# Patient Record
Sex: MALE | Race: WHITE | NOT HISPANIC OR LATINO | Employment: UNEMPLOYED | URBAN - METROPOLITAN AREA
[De-identification: names, ages, dates, MRNs, and addresses within clinical notes are randomized per-mention and may not be internally consistent; named-entity substitution may affect disease eponyms.]

---

## 2019-01-10 ENCOUNTER — NON-PROVIDER VISIT (OUTPATIENT)
Dept: URGENT CARE | Facility: PHYSICIAN GROUP | Age: 29
End: 2019-01-10

## 2019-01-10 DIAGNOSIS — Z02.1 PRE-EMPLOYMENT DRUG SCREENING: ICD-10-CM

## 2019-01-10 LAB
AMP AMPHETAMINE: NORMAL
COC COCAINE: NORMAL
INT CON NEG: NEGATIVE
INT CON POS: POSITIVE
MET METHAMPHETAMINES: NORMAL
OPI OPIATES: NORMAL
PCP PHENCYCLIDINE: NORMAL
POC DRUG COMMENT 753798-OCCUPATIONAL HEALTH: NEGATIVE
THC: NORMAL

## 2019-01-10 PROCEDURE — 80305 DRUG TEST PRSMV DIR OPT OBS: CPT | Performed by: FAMILY MEDICINE

## 2019-11-26 ENCOUNTER — HOSPITAL ENCOUNTER (EMERGENCY)
Facility: MEDICAL CENTER | Age: 29
End: 2019-11-26
Attending: EMERGENCY MEDICINE
Payer: COMMERCIAL

## 2019-11-26 VITALS
RESPIRATION RATE: 18 BRPM | SYSTOLIC BLOOD PRESSURE: 110 MMHG | WEIGHT: 171.96 LBS | HEIGHT: 70 IN | OXYGEN SATURATION: 98 % | DIASTOLIC BLOOD PRESSURE: 65 MMHG | BODY MASS INDEX: 24.62 KG/M2 | TEMPERATURE: 97.9 F | HEART RATE: 76 BPM

## 2019-11-26 DIAGNOSIS — R11.2 NON-INTRACTABLE VOMITING WITH NAUSEA, UNSPECIFIED VOMITING TYPE: ICD-10-CM

## 2019-11-26 DIAGNOSIS — R19.7 DIARRHEA, UNSPECIFIED TYPE: ICD-10-CM

## 2019-11-26 LAB
ALBUMIN SERPL BCP-MCNC: 4.2 G/DL (ref 3.2–4.9)
ALBUMIN/GLOB SERPL: 1.8 G/DL
ALP SERPL-CCNC: 73 U/L (ref 30–99)
ALT SERPL-CCNC: 19 U/L (ref 2–50)
ANION GAP SERPL CALC-SCNC: 7 MMOL/L (ref 0–11.9)
AST SERPL-CCNC: 17 U/L (ref 12–45)
BASOPHILS # BLD AUTO: 0.4 % (ref 0–1.8)
BASOPHILS # BLD: 0.03 K/UL (ref 0–0.12)
BILIRUB SERPL-MCNC: 0.5 MG/DL (ref 0.1–1.5)
BUN SERPL-MCNC: 21 MG/DL (ref 8–22)
CALCIUM SERPL-MCNC: 8.4 MG/DL (ref 8.5–10.5)
CHLORIDE SERPL-SCNC: 102 MMOL/L (ref 96–112)
CO2 SERPL-SCNC: 30 MMOL/L (ref 20–33)
CREAT SERPL-MCNC: 1 MG/DL (ref 0.5–1.4)
EOSINOPHIL # BLD AUTO: 0.04 K/UL (ref 0–0.51)
EOSINOPHIL NFR BLD: 0.5 % (ref 0–6.9)
ERYTHROCYTE [DISTWIDTH] IN BLOOD BY AUTOMATED COUNT: 42 FL (ref 35.9–50)
GLOBULIN SER CALC-MCNC: 2.3 G/DL (ref 1.9–3.5)
GLUCOSE SERPL-MCNC: 94 MG/DL (ref 65–99)
HCT VFR BLD AUTO: 48.7 % (ref 42–52)
HGB BLD-MCNC: 16.4 G/DL (ref 14–18)
IMM GRANULOCYTES # BLD AUTO: 0.03 K/UL (ref 0–0.11)
IMM GRANULOCYTES NFR BLD AUTO: 0.4 % (ref 0–0.9)
LYMPHOCYTES # BLD AUTO: 1.35 K/UL (ref 1–4.8)
LYMPHOCYTES NFR BLD: 16.1 % (ref 22–41)
MCH RBC QN AUTO: 29.4 PG (ref 27–33)
MCHC RBC AUTO-ENTMCNC: 33.7 G/DL (ref 33.7–35.3)
MCV RBC AUTO: 87.3 FL (ref 81.4–97.8)
MONOCYTES # BLD AUTO: 0.72 K/UL (ref 0–0.85)
MONOCYTES NFR BLD AUTO: 8.6 % (ref 0–13.4)
NEUTROPHILS # BLD AUTO: 6.2 K/UL (ref 1.82–7.42)
NEUTROPHILS NFR BLD: 74 % (ref 44–72)
NRBC # BLD AUTO: 0 K/UL
NRBC BLD-RTO: 0 /100 WBC
PLATELET # BLD AUTO: 268 K/UL (ref 164–446)
PMV BLD AUTO: 10.2 FL (ref 9–12.9)
POTASSIUM SERPL-SCNC: 3.4 MMOL/L (ref 3.6–5.5)
PROT SERPL-MCNC: 6.5 G/DL (ref 6–8.2)
RBC # BLD AUTO: 5.58 M/UL (ref 4.7–6.1)
SODIUM SERPL-SCNC: 139 MMOL/L (ref 135–145)
WBC # BLD AUTO: 8.4 K/UL (ref 4.8–10.8)

## 2019-11-26 PROCEDURE — 36415 COLL VENOUS BLD VENIPUNCTURE: CPT

## 2019-11-26 PROCEDURE — 85025 COMPLETE CBC W/AUTO DIFF WBC: CPT

## 2019-11-26 PROCEDURE — 99284 EMERGENCY DEPT VISIT MOD MDM: CPT

## 2019-11-26 PROCEDURE — 96374 THER/PROPH/DIAG INJ IV PUSH: CPT

## 2019-11-26 PROCEDURE — 700111 HCHG RX REV CODE 636 W/ 250 OVERRIDE (IP): Performed by: EMERGENCY MEDICINE

## 2019-11-26 PROCEDURE — 80053 COMPREHEN METABOLIC PANEL: CPT

## 2019-11-26 PROCEDURE — 700105 HCHG RX REV CODE 258: Performed by: EMERGENCY MEDICINE

## 2019-11-26 RX ORDER — ONDANSETRON 2 MG/ML
4 INJECTION INTRAMUSCULAR; INTRAVENOUS ONCE
Status: COMPLETED | OUTPATIENT
Start: 2019-11-26 | End: 2019-11-26

## 2019-11-26 RX ORDER — SODIUM CHLORIDE 9 MG/ML
1000 INJECTION, SOLUTION INTRAVENOUS ONCE
Status: COMPLETED | OUTPATIENT
Start: 2019-11-26 | End: 2019-11-26

## 2019-11-26 RX ORDER — ONDANSETRON 4 MG/1
4 TABLET, ORALLY DISINTEGRATING ORAL EVERY 6 HOURS PRN
Qty: 16 TAB | Refills: 0 | Status: SHIPPED | OUTPATIENT
Start: 2019-11-26 | End: 2019-11-30

## 2019-11-26 RX ADMIN — SODIUM CHLORIDE 1000 ML: 9 INJECTION, SOLUTION INTRAVENOUS at 01:53

## 2019-11-26 RX ADMIN — ONDANSETRON 4 MG: 2 INJECTION INTRAMUSCULAR; INTRAVENOUS at 01:53

## 2019-11-26 ASSESSMENT — ENCOUNTER SYMPTOMS
SORE THROAT: 0
VOMITING: 1
BLOOD IN STOOL: 0
FEVER: 0
BACK PAIN: 0
CHILLS: 0
DIARRHEA: 1
SHORTNESS OF BREATH: 0
HEADACHES: 0
NAUSEA: 1
DIZZINESS: 0

## 2019-11-26 ASSESSMENT — LIFESTYLE VARIABLES: SUBSTANCE_ABUSE: 0

## 2019-11-26 NOTE — ED TRIAGE NOTES
"Jonathan Edward Kimura     Chief Complaint   Patient presents with   • N/V   • Diarrhea       Patient ambulated to triage for above complaint. Patient states his family recently had the stomach flu, and patient started to have symptoms last night. Denies abd pain, lightheadedness, CP.    Patient educated on triage process and to notify RN of any new or worsening symptoms.     Blood Pressure: 100/60, Pulse: 77, Respiration: 16, Temperature: 36.6 °C (97.9 °F), Height: 177.8 cm (5' 10\"), Weight: 78 kg (171 lb 15.3 oz), Pulse Oximetry: 98 %, O2 Delivery: None (Room Air)   "

## 2019-11-26 NOTE — ED NOTES
Pt ambulatory to room 23. Pt c/o n/v/d x 24 hours. Reports vomiting x5, diarrhea x 7-8, c/o intermittent left sided abd pain.

## 2019-11-26 NOTE — ED NOTES
PIV established, blood drawn, sent to lab. Pt medicated per ERP orders.  Updated pt on POC. Call light in reach.

## 2019-11-26 NOTE — ED NOTES
Pt given D/C instructions, prescription and follow up instructions pt has no further issues, N/V has resolved at present time.

## 2019-11-26 NOTE — ED PROVIDER NOTES
ED Provider Note     11/26/2019  1:32 AM    Means of Arrival: Walk In  History obtained by: patient  Limitations: none    CHIEF COMPLAINT  Chief Complaint   Patient presents with   • N/V   • Diarrhea       HPI  Jonathan Edward Kimura is a 29 y.o. male who presents with concerns of nausea, vomiting, and diarrhea for approximately 48 hours.  2 days ago he was driving to [employer] from [place] when symptoms started.  He has 2 family members with similar symptoms however there symptoms only lasted 24 hours.  He says he has been unable to tolerate any oral intake for over 24 hours.  Diarrhea is watery without blood.  Generalized abdominal discomfort without any localized pain.  No fevers.  He came to the emergency department because he was unable to keep any fluids down.    REVIEW OF SYSTEMS  Review of Systems   Constitutional: Negative for chills and fever.   HENT: Negative for sore throat.    Respiratory: Negative for shortness of breath.    Cardiovascular: Negative for chest pain.   Gastrointestinal: Positive for diarrhea, nausea and vomiting. Negative for blood in stool.        He has noticed some small streaks of blood in vomit but that was after multiple episodes of vomiting and retching.   Genitourinary: Negative for dysuria.   Musculoskeletal: Negative for back pain.   Neurological: Negative for dizziness and headaches.   Psychiatric/Behavioral: Negative for substance abuse.   All other systems reviewed and are negative.    See HPI for further details.    PAST MEDICAL HISTORY   Otherwise healthy    SOCIAL HISTORY  Social History     Tobacco Use   • Smoking status: Current Every Day Smoker     Packs/day: 0.00     Types: Cigarettes   • Smokeless tobacco: Never Used   • Tobacco comment: 2-3 day   Substance and Sexual Activity   • Alcohol use: Yes     Comment: occ   • Drug use: Never   • Sexual activity: Not on file       SURGICAL HISTORY  patient denies any surgical history    CURRENT MEDICATIONS  Home Medications      "Reviewed by Raisa Trevino R.N. (Registered Nurse) on 11/26/19 at 0111  Med List Status: Complete   Medication Last Dose Status        Patient Dusty Taking any Medications                       ALLERGIES  No Known Allergies    PHYSICAL EXAM  VITAL SIGNS: /67   Pulse 61   Temp 36.6 °C (97.9 °F) (Oral)   Resp 15   Ht 1.778 m (5' 10\")   Wt 78 kg (171 lb 15.3 oz)   SpO2 98%   BMI 24.67 kg/m²     Pulse ox interpretation: I interpret this pulse ox as normal.  Constitutional: Alert in no apparent distress.  Well-nourished 29-year-old male.  HENT: No signs of trauma, Bilateral external ears normal, Nose normal.  Dry mucous membranes.  Eyes: Pupils are equal, Conjunctiva normal, Non-icteric.   Neck: Normal range of motion, No tenderness, Supple, No stridor.   Cardiovascular: Regular rate and rhythm, no murmurs. Symmetric distal pulses. No cyanosis of extremities. No peripheral edema of extremities.  Thorax & Lungs: Normal breath sounds, No respiratory distress, No wheezing, No chest tenderness.   Abdomen: Bowel sounds normal, Soft, No tenderness, No masses, No peritoneal signs.  Skin: Warm, Dry, No erythema, No rash.   Back: No midline bony tenderness, No CVA tenderness.   Musculoskeletal: Good range of motion in all major joints. No tenderness to palpation or major deformities noted.   Neurologic: Alert , Normal motor function, Normal sensory function, No focal deficits noted.   Psychiatric: Affect normal, Judgment normal, Mood normal.   Physical Exam      DIAGNOSTIC STUDIES / PROCEDURES    LABS  Pertinent Labs & Imaging studies reviewed. (See chart for details)    RADIOLOGY  Pertinent Labs & Imaging studies reviewed. (See chart for details)    COURSE & MEDICAL DECISION MAKING  Pertinent Labs & Imaging studies reviewed. (See chart for details)    1:32 AM This is an emergent evaluation of a  29 y.o. male who presents with nausea, vomiting, diarrhea for nearly 48 hours.  Unable to tolerate any oral intake.. " Physical exam significant for signs of dehydration with dry mucous membranes.  Vitals are normal.  Abdomen is soft and nontender.  The differential diagnosis includes but is not limited to most likely viral gastroenteritis, lower suspicion for foodborne illness.  Low suspicion for acute abdomen such as appendicitis, pancreatitis, colitis.  Ordered for blood count and metabolic panel to evaluate. Patient will be treated with Zofran, IV fluids for his symptoms.  IV fluids to be given for signs of dehydration, unable to tolerate oral intake.  After receiving fluids and Zofran we will do a p.o. challenge.    3:45 AM  Labs within acceptable limits.  Potassium is slightly below normal but this should easily be replenished with oral intake, I balance solution.  Encouraged him to continue oral fluids at home for rehydration.  His vitals continue to be stable after fluids.  He has no abdominal pain at this time.  He has tolerated oral intake.  I continue to suspect that his presentation is most likely due to viral gastroenteritis.  He was given instructions to return if unable to stop vomiting, localized abdominal pain, grossly bloody diarrhea or any other serious concerns.  He is also instructed to contact primary provider for reevaluation if he continues to have diarrhea without any other severe symptoms.     The patient will return for worsening symptoms and is stable at the time of discharge. The patient verbalizes understanding. Guidance was provided on appropriate use of medications including driving under the influence, overdose, and side effects.         FINAL IMPRESSION    ICD-10-CM   1. Diarrhea, unspecified type R19.7   2. Non-intractable vomiting with nausea, unspecified vomiting type R11.2            This dictation was created using voice recognition software. The accuracy of the dictation is limited to the abilities of the software. I expect there may be some errors of grammar and possibly content. The nursing  notes were reviewed and certain aspects of this information were incorporated into this note.    Electronically signed by: Pedro Jenkins II, 11/26/2019 1:32 AM

## 2020-02-17 ENCOUNTER — HOSPITAL ENCOUNTER (OUTPATIENT)
Dept: RADIOLOGY | Facility: MEDICAL CENTER | Age: 30
End: 2020-02-17
Attending: FAMILY MEDICINE
Payer: COMMERCIAL

## 2020-02-17 ENCOUNTER — OFFICE VISIT (OUTPATIENT)
Dept: URGENT CARE | Facility: PHYSICIAN GROUP | Age: 30
End: 2020-02-17
Payer: COMMERCIAL

## 2020-02-17 VITALS
HEIGHT: 70 IN | RESPIRATION RATE: 20 BRPM | SYSTOLIC BLOOD PRESSURE: 86 MMHG | HEART RATE: 70 BPM | TEMPERATURE: 99.1 F | BODY MASS INDEX: 24.62 KG/M2 | WEIGHT: 172 LBS | OXYGEN SATURATION: 97 % | DIASTOLIC BLOOD PRESSURE: 62 MMHG

## 2020-02-17 DIAGNOSIS — S80.12XA CONTUSION OF LEFT LOWER EXTREMITY, INITIAL ENCOUNTER: ICD-10-CM

## 2020-02-17 DIAGNOSIS — S89.92XA SHIN INJURY, LEFT, INITIAL ENCOUNTER: ICD-10-CM

## 2020-02-17 PROCEDURE — 73590 X-RAY EXAM OF LOWER LEG: CPT | Mod: LT

## 2020-02-17 PROCEDURE — 99203 OFFICE O/P NEW LOW 30 MIN: CPT | Performed by: FAMILY MEDICINE

## 2020-02-17 RX ORDER — DICLOFENAC SODIUM 75 MG/1
75 TABLET, DELAYED RELEASE ORAL 2 TIMES DAILY
Qty: 14 TAB | Refills: 0 | Status: SHIPPED | OUTPATIENT
Start: 2020-02-17 | End: 2020-03-31

## 2020-02-17 ASSESSMENT — PAIN SCALES - GENERAL: PAINLEVEL: 7=MODERATE-SEVERE PAIN

## 2020-02-17 NOTE — LETTER
February 17, 2020      To Whom It May Concern:           This is confirmation that Jonathan Edward Kimura attended his scheduled appointment with Geovanny Aldana M.D. on 2/17/20.  Please excuse him from work.  He needs to be on crutches and off of his left foot for the next 3 days.             If you have any questions please do not hesitate to call me at the phone number listed below.      Sincerely,          Geovanny Aldana M.D.  [phone]

## 2020-02-18 ASSESSMENT — ENCOUNTER SYMPTOMS
FEVER: 0
FOCAL WEAKNESS: 0
FALLS: 1
SHORTNESS OF BREATH: 0
TINGLING: 0
VOMITING: 0
SENSORY CHANGE: 0

## 2020-02-18 NOTE — PROGRESS NOTES
"Subjective:     Jonathan Edward Kimura is a 29 y.o. male who presents for Leg Pain (L leg injury, fell yesterday)    HPI  Pt presents for evaluation of a new problem   Patient fell through a horse trailer yesterday and landed on left shin  Sustained only a small abrasion and did not seek medical care at the time  Woke up this morning and pain worsening when trying to walk  Pain is mainly along the anterior shin  Pain is constantly present  Has some associated swelling in shin    Review of Systems   Constitutional: Negative for fever.   Respiratory: Negative for shortness of breath.    Cardiovascular: Negative for chest pain.   Gastrointestinal: Negative for vomiting.   Musculoskeletal: Positive for falls.   Skin: Negative for rash.   Neurological: Negative for tingling, sensory change and focal weakness.     PMH: No chronic medical problems   MEDS: No daily meds   ALLERGIES: No Known Allergies  SURGHX: None  SOCHX:  reports that he has been smoking cigarettes. He has been smoking about 0.00 packs per day. He has never used smokeless tobacco. He reports current alcohol use. He reports that he does not use drugs.  FH: Family history was reviewed, not contributing to acute injury      Objective:   BP (!) 86/62   Pulse 70   Temp 37.3 °C (99.1 °F) (Temporal)   Resp 20   Ht 1.778 m (5' 10\")   Wt 78 kg (172 lb)   SpO2 97%   BMI 24.68 kg/m²     Physical Exam  Constitutional:       General: He is not in acute distress.     Appearance: He is well-developed. He is not diaphoretic.   HENT:      Head: Normocephalic and atraumatic.   Musculoskeletal:      Comments: Left lower extremity   Appearance - Small abrasion along anterior shin  Palpation - +TTP over anterior shin along tibia and anterior compartment   ROM - FROM knee and ankle  Strength - 5/5 throughout  Neuro - Sensation intact   Vascular - Foot warm, good perfusion, dorsalis pedis pulse 2+   Skin:     General: Skin is warm and dry.   Neurological:      Mental " Status: He is alert and oriented to person, place, and time.   Psychiatric:         Behavior: Behavior normal.         Thought Content: Thought content normal.         Judgment: Judgment normal.       Assessment/Plan:   Assessment    1. Contusion of left lower extremity, initial encounter  - DX-TIBIA AND FIBULA LEFT; Future  - diclofenac EC (VOLTAREN) 75 MG Tablet Delayed Response; Take 1 Tab by mouth 2 times a day.  Dispense: 14 Tab; Refill: 0    Patient with left shin contusion after a fall yesterday.  Markedly tender over the bony prominence and an x-ray was obtained to rule out fracture.  X-ray within normal limits.  Patient has no signs of compartment syndrome, however did discuss these signs to watch for in case he should develop with them over the next few days.  Patient will take scheduled anti-inflammatory, use ice, and weightbearing as tolerated for the next few days.  Reviewed close follow-up precautions and will follow-up with his PCP.

## 2020-02-28 ENCOUNTER — OFFICE VISIT (OUTPATIENT)
Dept: MEDICAL GROUP | Facility: PHYSICIAN GROUP | Age: 30
End: 2020-02-28
Payer: COMMERCIAL

## 2020-02-28 VITALS
TEMPERATURE: 98.9 F | HEART RATE: 78 BPM | HEIGHT: 70 IN | DIASTOLIC BLOOD PRESSURE: 62 MMHG | BODY MASS INDEX: 24.77 KG/M2 | OXYGEN SATURATION: 98 % | SYSTOLIC BLOOD PRESSURE: 118 MMHG | WEIGHT: 173 LBS

## 2020-02-28 DIAGNOSIS — Z11.3 ROUTINE SCREENING FOR STI (SEXUALLY TRANSMITTED INFECTION): ICD-10-CM

## 2020-02-28 DIAGNOSIS — R29.898 KNEE CLICKING: ICD-10-CM

## 2020-02-28 DIAGNOSIS — S89.92XD INJURY OF LEFT KNEE, SUBSEQUENT ENCOUNTER: ICD-10-CM

## 2020-02-28 DIAGNOSIS — M25.362 INSTABILITY OF LEFT KNEE JOINT: ICD-10-CM

## 2020-02-28 DIAGNOSIS — Z13.21 ENCOUNTER FOR VITAMIN DEFICIENCY SCREENING: ICD-10-CM

## 2020-02-28 DIAGNOSIS — M25.562 ACUTE PAIN OF LEFT KNEE: ICD-10-CM

## 2020-02-28 DIAGNOSIS — Z13.29 SCREENING FOR THYROID DISORDER: ICD-10-CM

## 2020-02-28 DIAGNOSIS — Z20.2 EXPOSURE TO STD: ICD-10-CM

## 2020-02-28 DIAGNOSIS — R06.83 SNORING: ICD-10-CM

## 2020-02-28 DIAGNOSIS — Z23 NEED FOR VACCINATION: ICD-10-CM

## 2020-02-28 DIAGNOSIS — R40.0 DAYTIME SOMNOLENCE: ICD-10-CM

## 2020-02-28 DIAGNOSIS — Z13.6 SCREENING FOR CARDIOVASCULAR CONDITION: ICD-10-CM

## 2020-02-28 DIAGNOSIS — R53.82 CHRONIC FATIGUE: ICD-10-CM

## 2020-02-28 PROCEDURE — 99214 OFFICE O/P EST MOD 30 MIN: CPT | Mod: 25 | Performed by: NURSE PRACTITIONER

## 2020-02-28 PROCEDURE — 90715 TDAP VACCINE 7 YRS/> IM: CPT | Performed by: NURSE PRACTITIONER

## 2020-02-28 PROCEDURE — 90471 IMMUNIZATION ADMIN: CPT | Performed by: NURSE PRACTITIONER

## 2020-02-28 SDOH — HEALTH STABILITY: MENTAL HEALTH: HOW MANY STANDARD DRINKS CONTAINING ALCOHOL DO YOU HAVE ON A TYPICAL DAY?: 5 OR 6

## 2020-02-28 SDOH — HEALTH STABILITY: MENTAL HEALTH: HOW OFTEN DO YOU HAVE A DRINK CONTAINING ALCOHOL?: MONTHLY OR LESS

## 2020-02-28 SDOH — HEALTH STABILITY: MENTAL HEALTH: HOW OFTEN DO YOU HAVE 6 OR MORE DRINKS ON ONE OCCASION?: MONTHLY

## 2020-02-28 ASSESSMENT — PATIENT HEALTH QUESTIONNAIRE - PHQ9: CLINICAL INTERPRETATION OF PHQ2 SCORE: 0

## 2020-02-28 ASSESSMENT — FIBROSIS 4 INDEX: FIB4 SCORE: 0.42
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
## 2020-03-02 ENCOUNTER — HOSPITAL ENCOUNTER (OUTPATIENT)
Dept: LAB | Facility: MEDICAL CENTER | Age: 30
End: 2020-03-02
Attending: NURSE PRACTITIONER
Payer: COMMERCIAL

## 2020-03-02 DIAGNOSIS — Z13.6 SCREENING FOR CARDIOVASCULAR CONDITION: ICD-10-CM

## 2020-03-02 DIAGNOSIS — Z13.29 SCREENING FOR THYROID DISORDER: ICD-10-CM

## 2020-03-02 DIAGNOSIS — Z11.3 ROUTINE SCREENING FOR STI (SEXUALLY TRANSMITTED INFECTION): ICD-10-CM

## 2020-03-02 DIAGNOSIS — Z13.21 ENCOUNTER FOR VITAMIN DEFICIENCY SCREENING: ICD-10-CM

## 2020-03-02 DIAGNOSIS — R53.82 CHRONIC FATIGUE: ICD-10-CM

## 2020-03-02 LAB
BASOPHILS # BLD AUTO: 0.9 % (ref 0–1.8)
BASOPHILS # BLD: 0.06 K/UL (ref 0–0.12)
EOSINOPHIL # BLD AUTO: 0.1 K/UL (ref 0–0.51)
EOSINOPHIL NFR BLD: 1.4 % (ref 0–6.9)
ERYTHROCYTE [DISTWIDTH] IN BLOOD BY AUTOMATED COUNT: 43.7 FL (ref 35.9–50)
HCT VFR BLD AUTO: 49.8 % (ref 42–52)
HGB BLD-MCNC: 16.6 G/DL (ref 14–18)
IMM GRANULOCYTES # BLD AUTO: 0.02 K/UL (ref 0–0.11)
IMM GRANULOCYTES NFR BLD AUTO: 0.3 % (ref 0–0.9)
LYMPHOCYTES # BLD AUTO: 2.09 K/UL (ref 1–4.8)
LYMPHOCYTES NFR BLD: 30.3 % (ref 22–41)
MCH RBC QN AUTO: 29.4 PG (ref 27–33)
MCHC RBC AUTO-ENTMCNC: 33.3 G/DL (ref 33.7–35.3)
MCV RBC AUTO: 88.3 FL (ref 81.4–97.8)
MONOCYTES # BLD AUTO: 0.54 K/UL (ref 0–0.85)
MONOCYTES NFR BLD AUTO: 7.8 % (ref 0–13.4)
NEUTROPHILS # BLD AUTO: 4.09 K/UL (ref 1.82–7.42)
NEUTROPHILS NFR BLD: 59.3 % (ref 44–72)
NRBC # BLD AUTO: 0 K/UL
NRBC BLD-RTO: 0 /100 WBC
PLATELET # BLD AUTO: 312 K/UL (ref 164–446)
PMV BLD AUTO: 10.2 FL (ref 9–12.9)
RBC # BLD AUTO: 5.64 M/UL (ref 4.7–6.1)
WBC # BLD AUTO: 6.9 K/UL (ref 4.8–10.8)

## 2020-03-02 PROCEDURE — 80061 LIPID PANEL: CPT

## 2020-03-02 PROCEDURE — 84270 ASSAY OF SEX HORMONE GLOBUL: CPT

## 2020-03-02 PROCEDURE — 84443 ASSAY THYROID STIM HORMONE: CPT

## 2020-03-02 PROCEDURE — 87389 HIV-1 AG W/HIV-1&-2 AB AG IA: CPT

## 2020-03-02 PROCEDURE — 87591 N.GONORRHOEAE DNA AMP PROB: CPT

## 2020-03-02 PROCEDURE — 80053 COMPREHEN METABOLIC PANEL: CPT

## 2020-03-02 PROCEDURE — 84402 ASSAY OF FREE TESTOSTERONE: CPT

## 2020-03-02 PROCEDURE — 87491 CHLMYD TRACH DNA AMP PROBE: CPT

## 2020-03-02 PROCEDURE — 86780 TREPONEMA PALLIDUM: CPT

## 2020-03-02 PROCEDURE — 36415 COLL VENOUS BLD VENIPUNCTURE: CPT

## 2020-03-02 PROCEDURE — 85025 COMPLETE CBC W/AUTO DIFF WBC: CPT

## 2020-03-02 PROCEDURE — 84403 ASSAY OF TOTAL TESTOSTERONE: CPT

## 2020-03-03 LAB
ALBUMIN SERPL BCP-MCNC: 4.2 G/DL (ref 3.2–4.9)
ALBUMIN/GLOB SERPL: 1.5 G/DL
ALP SERPL-CCNC: 76 U/L (ref 30–99)
ALT SERPL-CCNC: 21 U/L (ref 2–50)
ANION GAP SERPL CALC-SCNC: 7 MMOL/L (ref 0–11.9)
AST SERPL-CCNC: 18 U/L (ref 12–45)
BILIRUB SERPL-MCNC: 0.6 MG/DL (ref 0.1–1.5)
BUN SERPL-MCNC: 18 MG/DL (ref 8–22)
C TRACH DNA SPEC QL NAA+PROBE: NEGATIVE
CALCIUM SERPL-MCNC: 9.1 MG/DL (ref 8.5–10.5)
CHLORIDE SERPL-SCNC: 105 MMOL/L (ref 96–112)
CHOLEST SERPL-MCNC: 157 MG/DL (ref 100–199)
CO2 SERPL-SCNC: 28 MMOL/L (ref 20–33)
CREAT SERPL-MCNC: 1.07 MG/DL (ref 0.5–1.4)
GLOBULIN SER CALC-MCNC: 2.8 G/DL (ref 1.9–3.5)
GLUCOSE SERPL-MCNC: 94 MG/DL (ref 65–99)
HDLC SERPL-MCNC: 49 MG/DL
HIV 1+2 AB+HIV1 P24 AG SERPL QL IA: NON REACTIVE
LDLC SERPL CALC-MCNC: 95 MG/DL
N GONORRHOEA DNA SPEC QL NAA+PROBE: NEGATIVE
POTASSIUM SERPL-SCNC: 4.2 MMOL/L (ref 3.6–5.5)
PROT SERPL-MCNC: 7 G/DL (ref 6–8.2)
SODIUM SERPL-SCNC: 140 MMOL/L (ref 135–145)
SPECIMEN SOURCE: NORMAL
TREPONEMA PALLIDUM IGG+IGM AB [PRESENCE] IN SERUM OR PLASMA BY IMMUNOASSAY: NON REACTIVE
TRIGL SERPL-MCNC: 67 MG/DL (ref 0–149)
TSH SERPL DL<=0.005 MIU/L-ACNC: 1.04 UIU/ML (ref 0.38–5.33)

## 2020-03-04 ENCOUNTER — OFFICE VISIT (OUTPATIENT)
Dept: MEDICAL GROUP | Facility: CLINIC | Age: 30
End: 2020-03-04
Payer: COMMERCIAL

## 2020-03-04 VITALS
WEIGHT: 173 LBS | RESPIRATION RATE: 12 BRPM | TEMPERATURE: 98.2 F | OXYGEN SATURATION: 98 % | HEIGHT: 70 IN | HEART RATE: 68 BPM | SYSTOLIC BLOOD PRESSURE: 104 MMHG | DIASTOLIC BLOOD PRESSURE: 62 MMHG | BODY MASS INDEX: 24.77 KG/M2

## 2020-03-04 DIAGNOSIS — M23.92 INTERNAL DERANGEMENT OF KNEE JOINT, LEFT: ICD-10-CM

## 2020-03-04 LAB
SHBG SERPL-SCNC: 37 NMOL/L (ref 11–80)
TESTOST FREE MFR SERPL: 1.8 % (ref 1.6–2.9)
TESTOST FREE SERPL-MCNC: 118 PG/ML (ref 47–244)
TESTOST SERPL-MCNC: 639 NG/DL (ref 300–1080)

## 2020-03-04 PROCEDURE — 99213 OFFICE O/P EST LOW 20 MIN: CPT | Performed by: FAMILY MEDICINE

## 2020-03-04 ASSESSMENT — ENCOUNTER SYMPTOMS
FOCAL WEAKNESS: 0
VOMITING: 0
FEVER: 0
SHORTNESS OF BREATH: 0

## 2020-03-04 ASSESSMENT — FIBROSIS 4 INDEX: FIB4 SCORE: 0.37

## 2020-03-04 NOTE — LETTER
March 4, 2020      To Whom It May Concern:           This is confirmation that Jonathan Edward Kimura attended his scheduled appointment with Geovanny Aldana M.D. on 3/04/20.  He has injured his left knee and must use crutch at all time.  He should stay off his feet as much as possible. No running, no heavy lifting, no standing for more than 10 minutes at a time.  Unclear exact course of recovery until MRI is obtained.             If you have any questions please do not hesitate to call me at the phone number listed below.      Sincerely,          Geovanny Aldana M.D.  [phone]

## 2020-03-05 ENCOUNTER — HOSPITAL ENCOUNTER (OUTPATIENT)
Dept: RADIOLOGY | Facility: MEDICAL CENTER | Age: 30
End: 2020-03-05
Attending: NURSE PRACTITIONER
Payer: COMMERCIAL

## 2020-03-05 DIAGNOSIS — M23.92 INTERNAL DERANGEMENT OF KNEE JOINT, LEFT: ICD-10-CM

## 2020-03-05 PROCEDURE — 73721 MRI JNT OF LWR EXTRE W/O DYE: CPT | Mod: LT
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
## 2020-03-08 ASSESSMENT — ENCOUNTER SYMPTOMS: SLEEP DISTURBANCE: 0

## 2020-03-09 ENCOUNTER — SLEEP CENTER VISIT (OUTPATIENT)
Dept: SLEEP MEDICINE | Facility: MEDICAL CENTER | Age: 30
End: 2020-03-09
Payer: COMMERCIAL

## 2020-03-09 VITALS
DIASTOLIC BLOOD PRESSURE: 62 MMHG | OXYGEN SATURATION: 98 % | BODY MASS INDEX: 24.91 KG/M2 | HEART RATE: 77 BPM | RESPIRATION RATE: 14 BRPM | SYSTOLIC BLOOD PRESSURE: 112 MMHG | WEIGHT: 174 LBS | HEIGHT: 70 IN

## 2020-03-09 DIAGNOSIS — F51.04 CHRONIC INSOMNIA: ICD-10-CM

## 2020-03-09 DIAGNOSIS — G47.30 SLEEP APNEA, UNSPECIFIED TYPE: ICD-10-CM

## 2020-03-09 DIAGNOSIS — G47.20 SLEEP-WAKE CYCLE DISORDER: ICD-10-CM

## 2020-03-09 DIAGNOSIS — R06.83 SNORING: ICD-10-CM

## 2020-03-09 DIAGNOSIS — F17.200 SMOKER: ICD-10-CM

## 2020-03-09 PROCEDURE — 99204 OFFICE O/P NEW MOD 45 MIN: CPT | Performed by: INTERNAL MEDICINE

## 2020-03-09 RX ORDER — ZOLPIDEM TARTRATE 5 MG/1
10 TABLET ORAL NIGHTLY PRN
Qty: 60 TAB | Refills: 1 | Status: SHIPPED | OUTPATIENT
Start: 2020-03-09 | End: 2020-05-08

## 2020-03-09 ASSESSMENT — FIBROSIS 4 INDEX: FIB4 SCORE: 0.37

## 2020-03-09 NOTE — PROGRESS NOTES
"CC: Excessive daytime somnolence, snoring, sleep-wake cycle issues.    HPI:   Mr. Kimura is a 30-year-old man referred by LIZETT Arriaga, to assist in the evaluation and management of sleep issues.    He has a least an 8-year history of what he describes as being a \"heavy sleeper\".  He perceives that he sleeps very deeply and is quite tired and groggy in the mornings as well as sleepy during the day.  These problems may have been a bit less severe when he worked night shift.  He is currently working a day shift from 7 AM to 5 PM.    He has a fairly regular sleep schedule with bedtime between 12:30 and 1 AM.  At bedtime he looks at his phone for 5 to 10 minutes but otherwise is not exposed to bright light at bedtime.  He falls asleep in a variable amount of time ranging from 10 minutes to about an hour.  He sleeps through the night and arises at 6:30 in the morning on workdays but sleeps until 9-10 AM on days off.  In the mornings he is consistently tired and groggy with occasional morning headaches.  His roommates have noted that he snores but no one is noted cyclic breathing or overt apnea.  He apparently does have a history of sleep talking.  He is sleepy during the day and does consume a number of energy drinks to maintain alertness.  He has not yet completed the Ketchum sleepiness score but he does not nap during the day.  He does not have symptoms suggesting narcolepsy or restless leg syndrome.  Specifically there is no history of irresistible sleep episodes or cataplexy.  He has not previously been evaluated for sleep issues.  He drinks alcohol occasionally but not to facilitate sleep.  He does smoke about 0.25 packs of cigarettes a day and has done so for 9 years.      Past Medical History:   Diagnosis Date   • Chickenpox        History reviewed. No pertinent surgical history.    Family History   Problem Relation Age of Onset   • Hyperlipidemia Mother    • Heart Disease Father         murmur   • Sleep " Apnea Neg Hx        Social History     Socioeconomic History   • Marital status: Single     Spouse name: Not on file   • Number of children: Not on file   • Years of education: Not on file   • Highest education level: Not on file   Occupational History   • Not on file   Social Needs   • Financial resource strain: Not on file   • Food insecurity     Worry: Not on file     Inability: Not on file   • Transportation needs     Medical: Not on file     Non-medical: Not on file   Tobacco Use   • Smoking status: Current Some Day Smoker     Packs/day: 0.25     Years: 9.00     Pack years: 2.25     Types: Cigarettes     Start date: 6/1/2011   • Smokeless tobacco: Current User     Types: Chew   • Tobacco comment: No only several cigarettes / day    Substance and Sexual Activity   • Alcohol use: Yes     Frequency: Monthly or less     Drinks per session: 5 or 6     Binge frequency: Monthly     Comment: socially    • Drug use: Never   • Sexual activity: Yes     Partners: Female     Birth control/protection: Condom   Lifestyle   • Physical activity     Days per week: Not on file     Minutes per session: Not on file   • Stress: Not on file   Relationships   • Social connections     Talks on phone: Not on file     Gets together: Not on file     Attends Protestant service: Not on file     Active member of club or organization: Not on file     Attends meetings of clubs or organizations: Not on file     Relationship status: Not on file   • Intimate partner violence     Fear of current or ex partner: Not on file     Emotionally abused: Not on file     Physically abused: Not on file     Forced sexual activity: Not on file   Other Topics Concern   • Not on file   Social History Narrative   • Not on file       Current Outpatient Medications   Medication Sig Dispense Refill   • zolpidem (AMBIEN) 5 MG Tab Take 2 Tabs by mouth at bedtime as needed for Sleep (Insomnia) for up to 60 days. 60 Tab 1   • diclofenac EC (VOLTAREN) 75 MG Tablet Delayed  "Response Take 1 Tab by mouth 2 times a day. (Patient not taking: Reported on 3/4/2020) 14 Tab 0     No current facility-administered medications for this visit.     \"CURRENT RX\"      Allergies: Patient has no known allergies.      ROS  Positive for the sleep issues reviewed above.  All other aspects of the CPT review of systems process are negative as documented in the attached self history form.      Physical Exam:   /62 (BP Location: Right arm, Patient Position: Sitting, BP Cuff Size: Adult)   Pulse 77   Resp 14   Ht 1.778 m (5' 10\")   Wt 78.9 kg (174 lb)   SpO2 98%   BMI 24.97 kg/m²    Head and neck examination demonstrates no mucosal lesion, purulent drainage or evident polyps. The pharynx is benign with a Mallampati II presentation. The neck is supple without thyromegaly. On chest examination there are symmetrical bilateral breath sounds without rales, wheezing or consolidation. On cardiac examination, the apical impulse and heart sounds are normal and the rhythm is regular. There is no murmur, gallop or rub and no jugular venous distention. The abdomen is soft with active bowel sounds and no palpable hepatosplenomegaly, mass, guarding or rebound. The extremities show no clubbing, cyanosis or edema and no signs of deep venous thrombosis. There is no warmth, redness, tenderness or palpable venous cord in the calves. The skin is clear, warm and dry. There is no unusual peripheral lymphadenopathy. Peripheral pulses are palpable in all 4 extremities. On neurologic examination, cranial nerve function is intact, motor tone is symmetrical, and the patient is alert, oriented and responsive.       Problems:  1. Sleep apnea, unspecified type  He presents with snoring and excessive daytime somnolence and has a fairly crowded posterior pharyngeal airway. The clinical probability of sleep apnea hypopnea syndrome is significant. Accurate diagnosis and effective treatment are required not only to improve levels of " daytime alertness but also to reduce the cardiac and neurologic risks associated with untreated sleep apnea. We have discussed diagnostic options including in-laboratory, attended polysomnography and home sleep testing. We have also discussed treatment options including airway pressurization, reconstructive otolaryngologic surgery, dental appliances and weight management.    2. Sleep-wake cycle disorder  Consistent with delayed sleep phase syndrome and significantly restricting nightly sleep time.  He does not seem to have major issues with sleep hygiene.    3. Chronic insomnia  He has some difficulty in falling asleep although he is able to maintain sleep and inquires about the use of a prescription soporific agent.  We have talked about the risks and benefits of prescription sleep aids including issues with daytime grogginess, nocturnal abnormal behaviors and rebound insomnia.  As a short-term treatment pending further evaluation I think the low-dose zolpidem can be safely used.    4. Snoring    5. Smoker  He is encouraged to discontinue smoking completely and permanently.      Plan:   1.  Polysomnogram, possible split-night study.  A formal polysomnogram is indicated with his chronic insomnia and mission critical work in the .    2.  Referral to Winter Prajapati, PhD.  He would benefit from a rigorous program of chronobiologic therapy to slowly adjust his sleep-wake cycle to a more conventional pattern maximizing nightly sleep time.  He may also be a candidate for cognitive behavioral therapy for his chronic insomnia.    3.  Zolpidem at 5 to 10 mg nightly as a temporary measure pending further evaluation and treatment.  We talked about the potential risks and benefits of that drug as described above.    4.  Return visit here after the sleep test.    We appreciate the opportunity to assist in his care.  Answers for HPI/ROS submitted by the patient on 3/8/2020   Year of your last physical exam: 2020  Results of  exam: Healthy  Occupation : [employer]  Height: 5'10  Current weight: 173  6 months ago: 158  At age 21: 160  What is the reason for your visit today?: Having trouble with heavy sleeping and sleep schedule.  Have you ever been hospitalized?: No  Have you ever had problems with anesthesia?: No  Have you experienced post-operative delirium?: No  Any complications with surgery?: No  What year did you receive your last Flu shot?: 2020  What year did you receive you last Pneumonia shot?: ?  Have you had a TB skin test? If so, please list the year and result:: Yes, negitive  Have you had Allergy skin testing? If so, please list the year and result:: no  Please briefly describe your sleep problem and how old you were when it began.: i over sleep and dont wake up to alarms and I was 22 or 23  How does this affect your daily life and activities? Please also rate how serious of a problem this is (1 = Not at all, 10 = Very Serious).: 8  Have you had any previous evaluations, examinations, or treatment for this sleep problem or any other problems with your sleep? If so, please describe the evaluation, treatment, and results.: no  Have you used any medications (prescribed or otherwise) to help your sleep problem? If yes, include name, amount, frequency, and the prescribing physician.: no  If employed, what time do you usually start and end work?: 7am and 4 or 5 pm  Do you ever change work shifts? If yes, describe how often (never, infrequently, regularly).: no  What time do you usually go to bed and wake up on: Weekdays? Weekends?: weekdays wake up around 7am and goto bed at 1 am. Weekends i goto bed around 3am and wake up between 12 and 1 pm  Do you have a regular bed partner?: No  How many minutes does it usually take to fall asleep at night after turning off the lights?: 25 to 45 min  What do you ordinarily do just prior to turning out the lights and attempting to go to sleep (e.g., reading,  TV, baths, etc.)?: look at my phone for 5 min and then try to sleep  On average, how many times do you wake up during the night?: never  On average, how many times do you wake up to use the bathroom?: never  Do you often wake up too early in the morning and are unable to return to sleep?: never  On average, how many hours of sleep do you get per night?: 5 to 6  How do you usually awaken?  Alarm, spontaneously, or other?: alarm or spontaneously  Is it difficult for you to awaken and get out of bed after sleeping? (Not at all, Sometimes, Very): very  Do you nap or return to bed after arising?: return to bed  Are you bothered by sleepiness during the day?: yes  Do you feel that you get too much sleep at night?: no  Do you feel that you get too little sleep at night?: yes  Do you usually feel tired during the day? If so, what do you attribute this to?: yes and i dont know  Do you find yourself falling asleep when you don't mean to? : no  Have you ever suddenly fallen?: no  Have you ever experienced sudden body weakness?: yes  If yes, were you aware of the things around you?: Yes  Was the weakness brought on by any particular event or feeling? If so, briefly describe.: no  Have you ever experienced weakness or paralysis upon going to sleep?: no  Have you ever experienced weakness or paralysis upon awakening from sleep?: no  Have you ever experienced seeing things or hearing voices/noises: That weren't real? On going to sleep? During the night? On awakening from sleep? During the day?: no  Do you have difficulty breathing at night? If yes, briefly describe.: I dont know  Have you been told you snore while asleep? If so, does it disturb a bed partner (or someone in the same room), or someone in the next room?: yes  Have you ever experienced doing something without being aware of the action? If yes, please describe.: yes  How many times per week does this occur?: I don't know how many times but I've had full conversions  "with my roommate and not knowing I have. I also have given my keys to my roommate and woke up not knowing that i have.  Have you ever experienced upon lying in bed before sleep or on awakening from sleep: Restlessness of legs, \"nervous legs\", \"creeping crawling\" sensation of legs, or twitching of legs?: no  Have you ever been told that your arms or legs jerk or twitch while you are asleep? If yes, how many times per night does this occur?: yes but unknown how man times  At what age did this first occur, and how many years has this occurred?: dont know  Does this seem to awaken you from your sleep?: No  Do you know, or have you ever been told that you do any of the following while sleeping: talk, walk, grit teeth, wet the bed, wake up screaming or seemingly afraid, have disturbing dreams, have unusual movements, wake up with headaches, (males) have erections? If yes to any of these, please indicate how many times per week, age started, last occurrence, treatment received.: I've been told I talk  and move in my sleep but dont know how often or when it started.  Has anyone in your family been known to have any sleep problems? If yes, please list the type of problem (e.g., trouble getting to sleep, too sleepy, bed wetting, etc.), the relationship of this person to you, and the treatment received.: no    "

## 2020-03-11 ENCOUNTER — OFFICE VISIT (OUTPATIENT)
Dept: MEDICAL GROUP | Facility: CLINIC | Age: 30
End: 2020-03-11
Payer: COMMERCIAL

## 2020-03-11 VITALS
SYSTOLIC BLOOD PRESSURE: 110 MMHG | OXYGEN SATURATION: 98 % | HEIGHT: 70 IN | TEMPERATURE: 98.2 F | DIASTOLIC BLOOD PRESSURE: 80 MMHG | BODY MASS INDEX: 24.77 KG/M2 | WEIGHT: 173 LBS | HEART RATE: 72 BPM | RESPIRATION RATE: 12 BRPM

## 2020-03-11 DIAGNOSIS — S83.512D SPRAIN OF ANTERIOR CRUCIATE LIGAMENT OF LEFT KNEE, SUBSEQUENT ENCOUNTER: ICD-10-CM

## 2020-03-11 PROCEDURE — 99213 OFFICE O/P EST LOW 20 MIN: CPT | Performed by: FAMILY MEDICINE

## 2020-03-11 ASSESSMENT — FIBROSIS 4 INDEX: FIB4 SCORE: 0.37

## 2020-03-11 NOTE — PROGRESS NOTES
"Subjective:     Jonathan Edward Kimura is a 30 y.o. male who presents for Results (The patient is here for left knee pain and MRI results. ) and Knee Pain    HPI  Pt presents for follow-up left lower extremity injury and also MRI results  Patient with a fall causing left shin contusion and left knee pain  Shin/calf are feeling much better, though  if pushing in the area  Knee is feeling a little bit better  MRI showed some signal in ACL suggestive of sprain  Patient has been using crutch to help him get around  No repeat falls or injuries since last visit    Review of Systems   Constitutional: Negative for fever.   Respiratory: Negative for shortness of breath.    Cardiovascular: Negative for chest pain.   Gastrointestinal: Negative for vomiting.   Skin: Negative for rash.   Neurological: Negative for focal weakness.     PMH:  has a past medical history of Chickenpox.  MEDS:   Current Outpatient Medications:   •  zolpidem (AMBIEN) 5 MG Tab, Take 2 Tabs by mouth at bedtime as needed for Sleep (Insomnia) for up to 60 days., Disp: 60 Tab, Rfl: 1  •  diclofenac EC (VOLTAREN) 75 MG Tablet Delayed Response, Take 1 Tab by mouth 2 times a day. (Patient not taking: Reported on 3/4/2020), Disp: 14 Tab, Rfl: 0  ALLERGIES: No Known Allergies  SURGHX: None  SOCHX:  reports that he has been smoking cigarettes. He started smoking about 8 years ago. He has a 2.25 pack-year smoking history. His smokeless tobacco use includes chew. He reports current alcohol use. He reports that he does not use drugs.     Objective:   /80 (BP Location: Left arm, Patient Position: Sitting, BP Cuff Size: Adult)   Pulse 72   Temp 36.8 °C (98.2 °F) (Temporal)   Resp 12   Ht 1.778 m (5' 10\")   Wt 78.5 kg (173 lb)   SpO2 98%   BMI 24.82 kg/m²     Physical Exam  Constitutional:       General: He is not in acute distress.     Appearance: He is well-developed. He is not diaphoretic.   HENT:      Head: Normocephalic and atraumatic. "   Musculoskeletal:      Comments: Left lower extremity   Appearance - Healing scabbed wound over anterior shin about 1/3 up shin  Palpation - +TTP along anterior shin around area of scab, no TTP of calf  ROM - FROM knee   Strength - 5/5 throughout  Neuro - Sensation equal and intact bilaterally   Skin:     General: Skin is warm and dry.      Findings: No rash.   Neurological:      Mental Status: He is alert and oriented to person, place, and time.   Psychiatric:         Behavior: Behavior normal.         Thought Content: Thought content normal.         Judgment: Judgment normal.       Assessment/Plan:   Assessment    1. Sprain of anterior cruciate ligament of left knee, subsequent encounter  - REFERRAL TO PHYSICAL THERAPY Reason for Therapy: Eval/Treat/Report    Will get patient into physical therapy and put on light duty for now.  Suspect approximately 6 weeks before he will be ready to go back to full duty as he is in the  and will require high level of physical fitness.  Will slowly start trying to ambulate without crutches and get back to normal daily activities.  Follow-up in 6 weeks.

## 2020-03-13 ASSESSMENT — ENCOUNTER SYMPTOMS
FEVER: 0
SHORTNESS OF BREATH: 0
VOMITING: 0
FOCAL WEAKNESS: 0

## 2020-03-16 ENCOUNTER — TELEPHONE (OUTPATIENT)
Dept: SLEEP MEDICINE | Facility: MEDICAL CENTER | Age: 30
End: 2020-03-16

## 2020-03-16 DIAGNOSIS — G47.33 OSA (OBSTRUCTIVE SLEEP APNEA): ICD-10-CM

## 2020-03-16 NOTE — TELEPHONE ENCOUNTER
Received call from RN, patient doesn't meet co-morbid conditions for in-lab sleep study, she is going to elevate to their medical director for formal denial.    Intake ID [id number]  Phone: [phone]

## 2020-03-17 ENCOUNTER — PHYSICAL THERAPY (OUTPATIENT)
Dept: PHYSICAL THERAPY | Facility: REHABILITATION | Age: 30
End: 2020-03-17
Attending: FAMILY MEDICINE
Payer: COMMERCIAL

## 2020-03-17 DIAGNOSIS — S83.512D SPRAIN OF ANTERIOR CRUCIATE LIGAMENT OF LEFT KNEE, SUBSEQUENT ENCOUNTER: ICD-10-CM

## 2020-03-17 PROCEDURE — 97161 PT EVAL LOW COMPLEX 20 MIN: CPT

## 2020-03-17 PROCEDURE — 97110 THERAPEUTIC EXERCISES: CPT

## 2020-03-17 SDOH — ECONOMIC STABILITY: GENERAL: QUALITY OF LIFE: FAIR

## 2020-03-17 ASSESSMENT — ENCOUNTER SYMPTOMS
PAIN SCALE AT LOWEST: 0
PAIN SCALE AT HIGHEST: 8
PAIN SCALE: 0

## 2020-03-17 NOTE — OP THERAPY EVALUATION
Outpatient Physical Therapy  INITIAL EVALUATION    [facility] [place]  [address]  [place] [address]  Phone:  [phone]  Fax:  [phone]    Date of Evaluation: 2020    Patient: Jonathan Edward Kimura  YOB: 1990  MRN: [id number]     Referring Provider: Geovanny Aldana M.D.  [id number] [facility]  [address]  [place] [address]   Referring Diagnosis Sprain of anterior cruciate ligament of left knee, subsequent encounter [S83.512D]     Time Calculation  Start time: 1530  Stop time: 1630 Time Calculation (min): 60 minutes       Physical Therapy Occurrence Codes    Date of onset of impairment:  20   Date physical therapy care plan established or reviewed:  3/17/20   Date physical therapy treatment started:  3/17/20          Chief Complaint: Knee Problem    Visit Diagnoses     ICD-10-CM   1. Sprain of anterior cruciate ligament of left knee, subsequent encounter S83.512D         Subjective:   History of Present Illness:     Date of onset:  2020  Quality of life:  Fair  Prior level of function:  Ongoing knee issues past trama  Pain:     Current pain ratin    At best pain ratin    At worst pain ratin  Diagnostic Tests:     MRI studies: abnormal    Treatments:     Current treatment:  Brace  Activities of Daily Living:     Patient reported ADL status: Limited stairs  Limited with lifting  Limited with all fitness activities  Use of crutches  Patient Goals:     Patient goals for therapy:  Increased motion, decreased pain and increased strength    Patient is a 30 y.o. male that presents to therapy with L ACL strain. States that symptoms were due to injury. Reports the pain quality to be sharp/dull, intermittent and are primarily anterior knee pain. Reports that symptoms nowgetting better / not changing. States that aggravating factors are stairs, car transfers, squatting. States that easng factors are rest, ice. Minor bouts of knee giving  way.    Past Medical History:   Diagnosis Date   • Chickenpox      No past surgical history on file.  Social History     Tobacco Use   • Smoking status: Current Some Day Smoker     Packs/day: 0.25     Years: 9.00     Pack years: 2.25     Types: Cigarettes     Start date: 6/1/2011   • Smokeless tobacco: Current User     Types: Chew   • Tobacco comment: No only several cigarettes / day    Substance Use Topics   • Alcohol use: Yes     Frequency: Monthly or less     Drinks per session: 5 or 6     Binge frequency: Monthly     Comment: socially      Family and Occupational History     Socioeconomic History   • Marital status: Single     Spouse name: Not on file   • Number of children: Not on file   • Years of education: Not on file   • Highest education level: Not on file   Occupational History   • Not on file       Objective     Postural Observations  Seated posture: poor  Standing posture: poor  Correction of posture: has no consistent effect        Neurological Testing     Reflexes   Left   Patellar (L4): normal (2+)  Achilles (S1): normal (2+)  Ankle clonus reflex: negative  Babinski sign: negative    Right   Patellar (L4): normal (2+)  Achilles (S1): normal (2+)  Ankle clonus reflex: negative  Babinski sign: negative    Myotome testing   Lumbar (left)   L1 (hip flexors): 5  L2 (hip flexors): 5  L3 (knee extensors): NT.  L4 (ankle dorsiflexors): 5  L5 (great toe extension): 5  S1 (ankle plantar flexors): 4-    Lumbar (right)   L1 (hip flexors): 5  L2 (hip flexors): 5  L3 (knee extensors): 5  L4 (ankle dorsiflexors): 5  L5 (great toe extension): 5  S1 (ankle plantar flexors): 5    Dermatome testing   Lumbar (left)   All left lumbar dermatomes intact    Lumbar (right)   All right lumbar dermatomes intact    Palpation   Left   No palpable tenderness to the adductor longus and adductor yobany.     Right   No palpable tenderness to the adductor longus and adductor yobany.     Tenderness   Left Knee   Tenderness in the pes  anserinus.     Active Range of Motion   Left Knee   Flexion: 110 degrees   Extension: 0 degrees   Extensor la degrees     Right Knee   Flexion: 140 degrees   Extension: 0 degrees   Extensor la degrees     Passive Range of Motion   Left Knee   Flexion: 115 degrees     Patellar Mobility   Left Knee Patellar tendons within functional limits include the medial, lateral, superior and inferior.     Right Knee Patellar tendons within functional limits include the medial, lateral, superior and inferior.     Strength:      Left Hip   Planes of Motion   Abduction: 4-  Adduction: 4+    Right Hip   Planes of Motion   Abduction: 4+  Adduction: 4+    Tests     Additional Tests Details  Not done due to MRI posting         Therapeutic Exercises (CPT 79191):       Therapeutic Exercise Summary: Access Code: [email or web address]         Exercises  · Clamshell - 10 reps - 2 sets - 1x daily - 7x weekly  · Supine Hamstring Stretch - 3 reps - 1 sets - 15sec hold - 1x daily - 7x weekly  · Supine Quad Set - 10 reps - 2 sets - 1x daily - 7x weekly  · Supine Single Leg Lift - 10 reps - 2 sets - 1x daily - 7x weekly  · Supine Heel Slides - 10 reps - 2 sets - 1x daily - 7x weekly          Time-based treatments/modalities:  Therapeutic exercise minutes (CPT 52514): 10 minutes       Assessment, Response and Plan:   Impairments: abnormal or restricted ROM, activity intolerance, difficulty performing job, impaired physical strength and pain with function    Assessment details:  Patient presents with signs and symptoms consistent with an acl strain. Patient limitations include weakness, decreased ROM, and pain. Patient demonstrated poor quad control and hip control. Patient will benefit from skilled therapy to improve the aforementioned deficits and decrease further functional decline.   Prognosis: fair    Goals:   Short Term Goals:   1) Patient's quad strength will improve to facilitate a consistent SLR with no lag.  2) Patient's hip abd strength will  improve by a half muscle grade to facilitate improved gait.  Short term goal time span:  2-4 weeks      Long Term Goals:    1) Patient's symptoms will improve to allow for a deep squat with no pain.  2) Patient's LEFS will improve by 28 to demonstrate functional improvement  Long term goal time span:  6-8 weeks    Plan:   Therapy options:  Physical therapy treatment to continue  Planned therapy interventions:  E Stim Unattended (CPT 79196), Hot or Cold Pack Therapy (CPT 58451), Manual Therapy (CPT 62062), Neuromuscular Re-education (CPT 14612) and Therapeutic Exercise (CPT 79524)  Frequency:  2x week  Duration in weeks:  6  Discussed with:  Patient      Functional Assessment Used  PT Functional Assessment Tool Used: LEFS  PT Functional Assessment Score: 22     Referring provider co-signature:  I have reviewed this plan of care and my co-signature certifies the need for services.  Certification Dates:   From 03/17/20   To 05/12/20    Physician Signature: ________________________________ Date: ______________

## 2020-03-19 ENCOUNTER — APPOINTMENT (OUTPATIENT)
Dept: PHYSICAL THERAPY | Facility: REHABILITATION | Age: 30
End: 2020-03-19
Attending: FAMILY MEDICINE
Payer: COMMERCIAL

## 2020-03-23 ENCOUNTER — TELEPHONE (OUTPATIENT)
Dept: URGENT CARE | Facility: CLINIC | Age: 30
End: 2020-03-23

## 2020-03-24 ENCOUNTER — PHYSICAL THERAPY (OUTPATIENT)
Dept: PHYSICAL THERAPY | Facility: REHABILITATION | Age: 30
End: 2020-03-24
Attending: FAMILY MEDICINE
Payer: COMMERCIAL

## 2020-03-24 DIAGNOSIS — S83.512D SPRAIN OF ANTERIOR CRUCIATE LIGAMENT OF LEFT KNEE, SUBSEQUENT ENCOUNTER: ICD-10-CM

## 2020-03-24 PROCEDURE — 97110 THERAPEUTIC EXERCISES: CPT

## 2020-03-24 PROCEDURE — 97014 ELECTRIC STIMULATION THERAPY: CPT

## 2020-03-24 NOTE — TELEPHONE ENCOUNTER
[facility] Disability paperwork was filled out by Dr. Aldana and emailed. The form with email confirmation was scanned into the chart. The patient was notified via telephone today.    [place]

## 2020-03-24 NOTE — OP THERAPY DAILY TREATMENT
Outpatient Physical Therapy  DAILY TREATMENT     [facility] Outpatient Physical Therapy [place]  [address], [address]  [facility] [address]  Phone:  [phone]  Fax:  [phone]    Date: 03/24/2020    Patient: Jonathan Edward Kimura  YOB: 1990  MRN: [id number]     Time Calculation  Start time: 0830  Stop time: 0915 Time Calculation (min): 45 minutes       Chief Complaint: Knee Problem    Visit #: 2    SUBJECTIVE:  Patient reports that using the crutch one and a while to decrease weightbearing has helped with pain. He continues to try weaning.     OBJECTIVE:  Current objective measures:   Noted early quad fatigue with SLR  Continued fair to poor control of IR          Therapeutic Exercises (CPT 13096):     1. Bike, x8min    2. Shuttle 4c, x30    3. Shuttle 4c two up one down, x30    4. Partial squat backs, x10    5. Trial standing on foam SL/DL, DNT/2min    6. SLS on ground, x30sec    7. Quad sets to SLR, x20    8. Clams, x20      Therapeutic Exercise Summary: Access Code: [email or web address]         Exercises  · Clamshell - 10 reps - 2 sets - 1x daily - 7x weekly  · Supine Hamstring Stretch - 3 reps - 1 sets - 15sec hold - 1x daily - 7x weekly  · Supine Quad Set - 10 reps - 2 sets - 1x daily - 7x weekly  · Supine Single Leg Lift - 10 reps - 2 sets - 1x daily - 7x weekly  · Supine Heel Slides - 10 reps - 2 sets - 1x daily - 7x weekly        Therapeutic Treatments and Modalities:     1. E Stim Unattended (CPT 74089), IFC with CP x15min 80-150hz    Time-based treatments/modalities:  Therapeutic exercise minutes (CPT 19133): 30 minutes       Pain rating before treatment: 3  Pain rating after treatment: 1    ASSESSMENT:   Response to treatment: Patient responded fair today with an overall early increase in quad fatigue with no increase in symptoms. Patient continues to demonstrate poor control with full weightbearing.     PLAN/RECOMMENDATIONS:   Plan for treatment: therapy treatment to continue next visit.  Planned  interventions for next visit: continue with current treatment.

## 2020-03-31 ENCOUNTER — APPOINTMENT (OUTPATIENT)
Dept: PHYSICAL THERAPY | Facility: REHABILITATION | Age: 30
End: 2020-03-31
Attending: FAMILY MEDICINE
Payer: COMMERCIAL

## 2020-03-31 ENCOUNTER — OFFICE VISIT (OUTPATIENT)
Dept: MEDICAL GROUP | Facility: PHYSICIAN GROUP | Age: 30
End: 2020-03-31
Payer: COMMERCIAL

## 2020-03-31 VITALS
HEIGHT: 70 IN | DIASTOLIC BLOOD PRESSURE: 58 MMHG | TEMPERATURE: 98.4 F | OXYGEN SATURATION: 98 % | WEIGHT: 178 LBS | SYSTOLIC BLOOD PRESSURE: 102 MMHG | HEART RATE: 69 BPM | BODY MASS INDEX: 25.48 KG/M2

## 2020-03-31 DIAGNOSIS — F51.04 CHRONIC INSOMNIA: ICD-10-CM

## 2020-03-31 DIAGNOSIS — G47.20 SLEEP-WAKE DISORDER: ICD-10-CM

## 2020-03-31 DIAGNOSIS — G47.30 SLEEP APNEA, UNSPECIFIED TYPE: ICD-10-CM

## 2020-03-31 DIAGNOSIS — S83.512S SPRAIN OF ANTERIOR CRUCIATE LIGAMENT OF LEFT KNEE, SEQUELA: ICD-10-CM

## 2020-03-31 DIAGNOSIS — B36.0 TINEA VERSICOLOR: ICD-10-CM

## 2020-03-31 PROCEDURE — 99214 OFFICE O/P EST MOD 30 MIN: CPT | Performed by: NURSE PRACTITIONER

## 2020-03-31 ASSESSMENT — FIBROSIS 4 INDEX: FIB4 SCORE: 0.37

## 2020-03-31 NOTE — PROGRESS NOTES
Chief Complaint   Patient presents with   • Follow-Up         Subjective:     Jonathan Edward Kimura is a 30 y.o. male presenting with ACL sprain.  He would like to review his recent labs.    ACL sprain: Patient seen Dr. Aldana at [facility] sports med.  Undergoing physical therapy and using a knee brace.  Does feel like he is recovering well, has follow-up with Dr. Aldana in April.  He is currently not working as he was laid off due to the downturn of the economy.    Sleep-wake disorder/insomnia/sleep apnea: Patient seen by [facility] pulmonology.  Sleep apnea diagnosis likely, patient to undergo home sleep study.  Also found to have a likely sleep-wake disorder and chronic insomnia, for this he is taking 5 mg of Ambien nightly.  He says that he sleeps well without any negative side effects however he does feel very groggy in the next day.  He is supposed to get connected with a behavioral therapist regarding sleep-wake strategies.    Requesting testosterone: Patient has mentioned many times that he wishes to start testosterone supplementation for physical performance.  He is a very healthy 30-year-old and upon recent labs it was shown that his hormones are completely normal.  He states that his sister is a nurse in [place] and has the ability to get him testosterone.  He feels that this is his only option other than taking steroids that he would purchase in order to get stronger, bigger, faster.      Review of systems:      Denies chest pain, shortness of breath, sore throat, difficulty swallowing, new cough, dizziness, severe headache, altered cognition, changes in bowel or bladder habits, decreased sensation, decreased strength, numbness or tingling, intolerable depression or anxiety, rash or skin concerns, changes in vision,  myalgias, painful or swollen lymph nodes.       Current Outpatient Medications:   •  Terbinafine 1 % Gel, 1 Application by Apply externally route 2 Times a Day for 14 days., Disp: 50 g,  "Rfl: 0  •  zolpidem (AMBIEN) 5 MG Tab, Take 2 Tabs by mouth at bedtime as needed for Sleep (Insomnia) for up to 60 days., Disp: 60 Tab, Rfl: 1    Allergies, past medical history, past surgical history, family history, social history reviewed and updated    Objective:     Vitals: /58 (BP Location: Right arm, Patient Position: Sitting, BP Cuff Size: Adult)   Pulse 69   Temp 36.9 °C (98.4 °F) (Temporal)   Ht 1.778 m (5' 10\")   Wt 80.7 kg (178 lb)   SpO2 98%   BMI 25.54 kg/m²   Constitutional: Alert, no distress, well-groomed.  Skin: Multiple small circular patches of pallor surrounding neck crease consistent with tinea versicolor.  Warm, dry, good turgor, no rashes in visible areas.  Eye: Equal, round and reactive, conjunctiva clear, lids normal.  ENMT: Lips without lesions, good dentition, moist mucous membranes.  Neck: Trachea midline, no masses, no thyromegaly.  Respiratory: Unlabored respiratory effort, no cough.  MSK: Normal gait, moves all extremities.  Neuro: Grossly non-focal.   Psych: Alert and oriented x3, normal affect and mood.      Assessment/Plan:     Amando was seen today for follow-up.    Diagnoses and all orders for this visit:    Sprain of anterior cruciate ligament of left knee, sequela  Comments:  Continue with PT and follow-up with Dr. Aldana.    Sleep-wake disorder  Comments:  Monitor for side effects of Ambien, do not mix with other sedative medications, alcohol, or other drugs.  Follow-up with CBT    Sleep apnea, unspecified type  Comments:  Incomplete at home sleep study and follow-up with pulmonology.    Chronic insomnia    Tinea versicolor: Recommended patient apply this twice daily for 2 weeks.  Diligent hygiene and appropriate skin care.  -     Terbinafine 1 % Gel; 1 Application by Apply externally route 2 Times a Day for 14 days.           Return in about 6 months (around 9/30/2020).    Patient verbalized understanding and agreed to plan of care.  Encouraged to contact " me with needs via Recoupt or by phone if needed.      I have placed the above orders and discussed them with an approved delegating provider.  The MA is performing the below orders under the direction of Dr Sanabria.    Please note that this dictation was created using voice recognition software. I have made every reasonable attempt to correct obvious errors, but I expect that there are errors of grammar and possibly content that I did not discover before finalizing the note.

## 2020-04-02 ENCOUNTER — APPOINTMENT (OUTPATIENT)
Dept: PHYSICAL THERAPY | Facility: REHABILITATION | Age: 30
End: 2020-04-02
Attending: FAMILY MEDICINE
Payer: COMMERCIAL

## 2020-04-07 ENCOUNTER — APPOINTMENT (OUTPATIENT)
Dept: PHYSICAL THERAPY | Facility: REHABILITATION | Age: 30
End: 2020-04-07
Attending: FAMILY MEDICINE
Payer: COMMERCIAL

## 2020-04-08 ENCOUNTER — HOME STUDY (OUTPATIENT)
Dept: SLEEP MEDICINE | Facility: MEDICAL CENTER | Age: 30
End: 2020-04-08
Attending: INTERNAL MEDICINE
Payer: COMMERCIAL

## 2020-04-08 DIAGNOSIS — G47.33 OSA (OBSTRUCTIVE SLEEP APNEA): ICD-10-CM

## 2020-04-08 PROCEDURE — 95806 SLEEP STUDY UNATT&RESP EFFT: CPT | Performed by: INTERNAL MEDICINE

## 2020-04-10 NOTE — PROCEDURES
Interpretation:    Mr. Kimura presented with snoring and excessive daytime somnolence.  He does not have medical or sleep diagnoses that would contraindicate a home sleep test.    353 minutes of monitoring time are included and the information appears to be of good quality for analysis.    The respiratory event index is 5.1 events per hour including obstructive apnea and hypopnea episodes with occasional central apneas.  The lowest arterial oxygen saturation is 82% on room air with an oxygen desaturation index of 4.5 events per hour.  He spends 31 minutes of the study time, or 9% of the study in total, with a saturation less than 90%.  The heart rate varies from 55 to 113 bpm.  Intermittent snoring is noted.    Assessment:  Mild obstructive sleep apnea hypopnea with a respiratory event index of 5.1 events per hour and a lowest arterial oxygen saturation of 82%.  The home sleep test does not distinguish sleep from wake time and may underestimate the severity of sleep disordered breathing, particularly in patients with significant insomnia.    Recommendations:  Clinical correlation is required.  As the patient presented with daytime somnolence and works in a critical job, treatment is probably indicated.  Some of the patient's daytime somnolence may be related to his delayed sleep phase syndrome and chronic insomnia, resulting in reduced nightly sleep time.  These issues could be best assessed through a formal polysomnogram which was the original recommendation but authorization for that study was declined by his insurer.  I believe that a split-night polysomnogram is indicated to better assess the sleep apnea hypopnea seen in this study and to guide CPAP pressure titration. Alternative treatments for sleep-disordered breathing include reconstructive otolaryngologic surgery, dental appliances and weight loss. If any these latter treatment options are selected, a follow-up polysomnogram is suggested to assess the  efficacy of therapy. Behavioral measures including avoidance of sedatives, alcohol and supine body position may be of additional benefit. Patients with severe sleep apnea are typically advised not to drive a motor vehicle or operate hazardous machinery until their daytime somnolence has been corrected.

## 2020-04-14 ENCOUNTER — APPOINTMENT (OUTPATIENT)
Dept: PHYSICAL THERAPY | Facility: REHABILITATION | Age: 30
End: 2020-04-14
Attending: FAMILY MEDICINE
Payer: COMMERCIAL

## 2020-04-21 ENCOUNTER — APPOINTMENT (OUTPATIENT)
Dept: SLEEP MEDICINE | Facility: MEDICAL CENTER | Age: 30
End: 2020-04-21
Payer: COMMERCIAL

## 2020-04-22 ENCOUNTER — OFFICE VISIT (OUTPATIENT)
Dept: MEDICAL GROUP | Facility: CLINIC | Age: 30
End: 2020-04-22
Payer: COMMERCIAL

## 2020-04-22 VITALS
TEMPERATURE: 98.1 F | HEIGHT: 70 IN | BODY MASS INDEX: 25.48 KG/M2 | SYSTOLIC BLOOD PRESSURE: 104 MMHG | RESPIRATION RATE: 12 BRPM | OXYGEN SATURATION: 98 % | WEIGHT: 178 LBS | HEART RATE: 60 BPM | DIASTOLIC BLOOD PRESSURE: 60 MMHG

## 2020-04-22 DIAGNOSIS — S83.512S SPRAIN OF ANTERIOR CRUCIATE LIGAMENT OF LEFT KNEE, SEQUELA: ICD-10-CM

## 2020-04-22 PROCEDURE — 99213 OFFICE O/P EST LOW 20 MIN: CPT | Performed by: FAMILY MEDICINE

## 2020-04-22 ASSESSMENT — ENCOUNTER SYMPTOMS
VOMITING: 0
SHORTNESS OF BREATH: 0
FEVER: 0

## 2020-04-22 ASSESSMENT — FIBROSIS 4 INDEX: FIB4 SCORE: 0.37

## 2020-04-22 NOTE — PROGRESS NOTES
"Subjective:     Jonathan Edward Kimura is a 30 y.o. male who presents for Knee Injury (The patient is here for left knee follow up. The knee feels better, but there is still pain.)    HPI  Pt presents for follow-up left knee injury   Pt with injury sustained on 2/16/20  MRI showed some signal in ACL suggestive of sprain  Has attended a few sessions of physical therapy and making improvements   Has had to currently put physical therapy on hold due to partial closing of physical therapy due to COVID-19 and also cost  Patient going on walks and is able to make it between one half mile and a mile before he needs to sit and rest  Pain is throughout the knee and has good days and bad days  Doing home exercise program given by physical therapy  No new falls or injuries since last visit  Pain is much worse with activity    Review of Systems   Constitutional: Negative for fever.   Respiratory: Negative for shortness of breath.    Cardiovascular: Negative for chest pain.   Gastrointestinal: Negative for vomiting.   Musculoskeletal: Positive for joint pain.   Skin: Negative for rash.     PMH:  has a past medical history of Chickenpox.  MEDS:   Current Outpatient Medications:   •  zolpidem (AMBIEN) 5 MG Tab, Take 2 Tabs by mouth at bedtime as needed for Sleep (Insomnia) for up to 60 days., Disp: 60 Tab, Rfl: 1  ALLERGIES: No Known Allergies  SURGHX: History reviewed. No pertinent surgical history.  SOCHX:  reports that he has been smoking cigarettes. He started smoking about 8 years ago. He has a 2.25 pack-year smoking history. His smokeless tobacco use includes chew. He reports current alcohol use. He reports that he does not use drugs.  FH: Family history was reviewed, not contributing to acute injury      Objective:   /60 (BP Location: Left arm, Patient Position: Sitting, BP Cuff Size: Adult)   Pulse 60   Temp 36.7 °C (98.1 °F) (Temporal)   Resp 12   Ht 1.778 m (5' 10\")   Wt 80.7 kg (178 lb)   SpO2 98%   BMI " 25.54 kg/m²     Physical Exam  Constitutional:       General: He is not in acute distress.     Appearance: He is well-developed. He is not diaphoretic.   HENT:      Head: Normocephalic and atraumatic.   Musculoskeletal:      Comments: Left knee  Appearance - No bruising, erythema, or deformity appreciated  Palpation - +TTP along peds anserine, joint lines, medial quad, lateral quad, patellar tendon.  No palpable effusion.  ROM - FROM without crepitus  Strength - 5/5 throughout  Neuro - Sensation equal and intact bilaterally  Special testing - No laxity with varus/valgus stress, neg Lachman's, neg patellar apprehension test   Skin:     General: Skin is warm and dry.      Findings: No rash.   Neurological:      Mental Status: He is alert and oriented to person, place, and time.   Psychiatric:         Behavior: Behavior normal.         Thought Content: Thought content normal.         Judgment: Judgment normal.       Assessment/Plan:   Assessment    1. Sprain of anterior cruciate ligament of left knee, sequela  Patient is a 30-year-old male with left ACL sprain and surrounding knee muscular strains after a fall.  Injury was 2 months ago and he continues to make slow steady progress.  Advised that he would make more wolf progress if he were able to follow-up with PT rather than doing home exercises on his own.  Patient will attempt to get back into formal physical therapy as I do feel it would be very helpful for him.  At this point, he is not ready for full duty through the  and filled out paperwork giving restrictions.  Discussed the long-term prognosis of his injury and advised that I would not expect him to be back to feeling 100% until closer to late summer/fall.  We will follow-up in one 1 month to monitor his progress.

## 2020-04-25 ENCOUNTER — TELEPHONE (OUTPATIENT)
Dept: URGENT CARE | Facility: CLINIC | Age: 30
End: 2020-04-25

## 2020-04-25 NOTE — TELEPHONE ENCOUNTER
Paperwork completed for the patient's employment and emailed to the appropriate correspondence. I called the patient, left message that the appropriate correspondence did receive the paperwork via email. I asked the patient to call our office if he has any other questions.     Form [employer] FORM 1071 is scanned into the chart.    Clover

## 2020-05-11 ENCOUNTER — TELEPHONE (OUTPATIENT)
Dept: MEDICAL GROUP | Facility: PHYSICIAN GROUP | Age: 30
End: 2020-05-11

## 2020-05-11 NOTE — TELEPHONE ENCOUNTER
Cecy Freedman, [facility]   [phone]    States Pt. Insurance won't cover lab work on 03/02/2020 for HIV/STD/Chlaymidia/Gonorrhea/Testosterone due to the [Z11.3] screening code.  [employer] requires more specific coding.    Please advise.

## 2020-05-12 NOTE — TELEPHONE ENCOUNTER
Per John and the [facility] coding department they are requesting a more specific ICD10 code for the STD lab testing for the following CPT code (s):     59319 and 61128 need a more specific ICD10 code as the STD screening ICD10 code Z11.3 is not specific enough for claims reimbursement.

## 2020-05-27 ENCOUNTER — OFFICE VISIT (OUTPATIENT)
Dept: MEDICAL GROUP | Facility: CLINIC | Age: 30
End: 2020-05-27
Payer: COMMERCIAL

## 2020-05-27 VITALS
HEART RATE: 62 BPM | SYSTOLIC BLOOD PRESSURE: 110 MMHG | DIASTOLIC BLOOD PRESSURE: 70 MMHG | OXYGEN SATURATION: 95 % | RESPIRATION RATE: 12 BRPM | BODY MASS INDEX: 25.48 KG/M2 | WEIGHT: 178 LBS | TEMPERATURE: 98.4 F | HEIGHT: 70 IN

## 2020-05-27 DIAGNOSIS — G89.29 CHRONIC PAIN OF LEFT KNEE: ICD-10-CM

## 2020-05-27 DIAGNOSIS — S83.512S SPRAIN OF ANTERIOR CRUCIATE LIGAMENT OF LEFT KNEE, SEQUELA: ICD-10-CM

## 2020-05-27 DIAGNOSIS — S80.02XA CONTUSION OF LEFT KNEE, INITIAL ENCOUNTER: ICD-10-CM

## 2020-05-27 DIAGNOSIS — M25.562 CHRONIC PAIN OF LEFT KNEE: ICD-10-CM

## 2020-05-27 PROCEDURE — 99213 OFFICE O/P EST LOW 20 MIN: CPT | Performed by: FAMILY MEDICINE

## 2020-05-27 ASSESSMENT — FIBROSIS 4 INDEX: FIB4 SCORE: 0.37

## 2020-05-27 ASSESSMENT — ENCOUNTER SYMPTOMS
SHORTNESS OF BREATH: 0
FEVER: 0
VOMITING: 0
FOCAL WEAKNESS: 0

## 2020-05-27 NOTE — PROGRESS NOTES
"Subjective:     Jonathan Edward Kimura is a 30 y.o. male who presents for Knee Injury (Left knee injury follow up. )    HPI  Pt presents for follow-up left knee injury   Pt with injury sustained on 2/16/20  MRI showed some signal in ACL suggestive of sprain  Patient attended a few sessions of physical therapy with Tay Clarke DPT, however had to cut course short due to COVID-19 pandemic and physical therapy office closing  Continues to do home exercises given by physical therapist  Hoping to restart physical therapy soon  Knee continues to improve, has good days and bad days  Pain is mainly anterior  Has increased popping  No locking/catching  Has been trying to cycle for exercise and able to make it 6 to 8 miles before knee becomes very sore    Review of Systems   Constitutional: Negative for fever.   Respiratory: Negative for shortness of breath.    Cardiovascular: Negative for chest pain.   Gastrointestinal: Negative for vomiting.   Skin: Negative for rash.   Neurological: Negative for focal weakness.       PMH:  has a past medical history of Chickenpox.  MEDS: No daily meds  ALLERGIES: No Known Allergies  SURGHX: None  SOCHX:  reports that he has been smoking cigarettes. He started smoking about 8 years ago. He has a 2.25 pack-year smoking history. His smokeless tobacco use includes chew. He reports current alcohol use. He reports that he does not use drugs.  FH: Family history was reviewed, not contributing to injury     Objective:   /70 (BP Location: Left arm, Patient Position: Sitting, BP Cuff Size: Adult)   Pulse 62   Temp 36.9 °C (98.4 °F) (Temporal)   Resp 12   Ht 1.778 m (5' 10\")   Wt 80.7 kg (178 lb)   SpO2 95%   BMI 25.54 kg/m²     Physical Exam  Constitutional:       General: He is not in acute distress.     Appearance: He is well-developed. He is not diaphoretic.   HENT:      Head: Normocephalic and atraumatic.   Skin:     General: Skin is warm and dry.      Findings: No rash. "   Neurological:      Mental Status: He is alert and oriented to person, place, and time.   Psychiatric:         Behavior: Behavior normal.         Thought Content: Thought content normal.         Judgment: Judgment normal.       Left knee  Appearance - No bruising, erythema, or deformity appreciated  Palpation - +TTP along pes anserine, patellar tendon, joint lines  ROM - FROM without crepitus  Strength - 5/5 throughout  Neuro - Sensation equal and intact bilaterally  Special testing - No laxity with varus/valgus stress, neg Jacy's, neg patellar apprehension test    Assessment/Plan:   Assessment    1. Sprain of anterior cruciate ligament of left knee, sequela    2. Contusion of left knee, initial encounter    3. Chronic pain of left knee    Patient continues to make slow improvements.  Unfortunately, was unable to adequately rehab through physical therapy due to COVID-19 pandemic.  Physical therapy office has been reopened and should be able to finish his physical therapy course now.  Anticipate full return to work hopefully in approximately 6 weeks.

## 2020-06-01 ENCOUNTER — TELEPHONE (OUTPATIENT)
Dept: URGENT CARE | Facility: CLINIC | Age: 30
End: 2020-06-01

## 2020-06-01 NOTE — TELEPHONE ENCOUNTER
"[facility] Disability Form completed by Dr. Aldana/[facility] on 05/27/2020 and form was emailed to the appropriate individual. Form with email confirmation was scanned into the chart. I attempted to call the patient, but could not leave a message due to \"the number not accepting phone calls at the moment\".    saad  "

## 2020-06-03 ENCOUNTER — TELEPHONE (OUTPATIENT)
Dept: MEDICAL GROUP | Facility: CLINIC | Age: 30
End: 2020-06-03

## 2020-06-03 NOTE — TELEPHONE ENCOUNTER
I spoke with the patient re: [employer] 1071 form, it was completed by Dr Katya sykes for last visit (5/27/2020) and I emailed the form to the appropriate individual. The form with email confirmation was scanned into the chart.    Clover

## 2020-07-01 ENCOUNTER — OFFICE VISIT (OUTPATIENT)
Dept: MEDICAL GROUP | Facility: CLINIC | Age: 30
End: 2020-07-01
Payer: COMMERCIAL

## 2020-07-01 VITALS
WEIGHT: 178 LBS | HEART RATE: 68 BPM | RESPIRATION RATE: 12 BRPM | HEIGHT: 70 IN | SYSTOLIC BLOOD PRESSURE: 102 MMHG | TEMPERATURE: 98.2 F | DIASTOLIC BLOOD PRESSURE: 70 MMHG | OXYGEN SATURATION: 98 % | BODY MASS INDEX: 25.48 KG/M2

## 2020-07-01 DIAGNOSIS — S83.512S SPRAIN OF ANTERIOR CRUCIATE LIGAMENT OF LEFT KNEE, SEQUELA: ICD-10-CM

## 2020-07-01 DIAGNOSIS — S80.02XD CONTUSION OF LEFT KNEE, SUBSEQUENT ENCOUNTER: ICD-10-CM

## 2020-07-01 PROCEDURE — 99213 OFFICE O/P EST LOW 20 MIN: CPT | Performed by: FAMILY MEDICINE

## 2020-07-01 ASSESSMENT — FIBROSIS 4 INDEX: FIB4 SCORE: 0.37

## 2020-07-01 NOTE — PROGRESS NOTES
"Subjective:     Jonathan Edward Kimura is a 30 y.o. male who presents for Knee Injury (Left knee injury follow up, knee is sore.  The went running yesterday, ran three miles. )    HPI  Pt presents for follow-up left knee injury  Pt with injury sustained on 2/16/20  MRI showed some signal in ACL suggestive of sprain  Patient was attending physical therapy with Tay Clarke DPT, however this was put on hold temporarily due to COVID-19 pandemic  Knee pain continues to make improvements, though very slowly  Able to run 3 miles with only mild pain  Does have some extra soreness the day after he pushes himself too hard  Knee pain continues to be all over the knee and sometimes changes day-to-day  No repeat falls or injuries recently  No radiating pain into the foot or hip  No numbness or tingling    Review of Systems   Constitutional: Negative for fever.   Respiratory: Negative for shortness of breath.    Cardiovascular: Negative for chest pain.   Gastrointestinal: Negative for vomiting.   Skin: Negative for rash.   Neurological: Negative for focal weakness.     PMH:  has a past medical history of Chickenpox.  MEDS: No current outpatient medications on file.  ALLERGIES: No Known Allergies  SURGHX: No past surgical history on file.  SOCHX:  reports that he has been smoking cigarettes. He started smoking about 9 years ago. He has a 2.25 pack-year smoking history. His smokeless tobacco use includes chew. He reports current alcohol use. He reports that he does not use drugs.     Objective:   /70 (BP Location: Left arm, Patient Position: Sitting, BP Cuff Size: Adult)   Pulse 68   Temp 36.8 °C (98.2 °F) (Temporal)   Resp 12   Ht 1.778 m (5' 10\")   Wt 80.7 kg (178 lb)   SpO2 98%   BMI 25.54 kg/m²     Physical Exam  Constitutional:       General: He is not in acute distress.     Appearance: He is well-developed. He is not diaphoretic.   HENT:      Head: Normocephalic and atraumatic.   Musculoskeletal:      " Comments: Left knee  Appearance - No bruising, erythema, or deformity appreciated  Palpation - +Mild TTP throughout and worst along medial quad and pes anserine   ROM - FROM without crepitus  Strength - 5/5 throughout  Neuro - Sensation equal and intact bilaterally  Special testing - No laxity or pain with varus/valgus stress, neg patellar apprehension test   Skin:     General: Skin is warm and dry.      Findings: No rash.   Neurological:      Mental Status: He is alert and oriented to person, place, and time.   Psychiatric:         Behavior: Behavior normal.         Thought Content: Thought content normal.         Judgment: Judgment normal.       Assessment/Plan:   Assessment    1. Sprain of anterior cruciate ligament of left knee, sequela    2. Contusion of left knee, subsequent encounter    Patient continues to make slow progress.  Emphasized importance of him getting back into physical therapy.  Patient is agreeable to this and will call PT to make an appointment.  Filled out paperwork with work restrictions.  Follow-up in 1 month.

## 2020-07-10 ASSESSMENT — ENCOUNTER SYMPTOMS
SHORTNESS OF BREATH: 0
VOMITING: 0
FOCAL WEAKNESS: 0
FEVER: 0

## 2020-08-05 ENCOUNTER — OFFICE VISIT (OUTPATIENT)
Dept: MEDICAL GROUP | Facility: CLINIC | Age: 30
End: 2020-08-05
Payer: COMMERCIAL

## 2020-08-05 VITALS
HEIGHT: 70 IN | OXYGEN SATURATION: 98 % | HEART RATE: 74 BPM | SYSTOLIC BLOOD PRESSURE: 114 MMHG | TEMPERATURE: 98.7 F | DIASTOLIC BLOOD PRESSURE: 72 MMHG | BODY MASS INDEX: 25.48 KG/M2 | WEIGHT: 178 LBS | RESPIRATION RATE: 14 BRPM

## 2020-08-05 DIAGNOSIS — S80.02XD CONTUSION OF LEFT KNEE, SUBSEQUENT ENCOUNTER: ICD-10-CM

## 2020-08-05 DIAGNOSIS — S83.512S SPRAIN OF ANTERIOR CRUCIATE LIGAMENT OF LEFT KNEE, SEQUELA: ICD-10-CM

## 2020-08-05 PROCEDURE — 99213 OFFICE O/P EST LOW 20 MIN: CPT | Mod: 25 | Performed by: FAMILY MEDICINE

## 2020-08-05 PROCEDURE — 20610 DRAIN/INJ JOINT/BURSA W/O US: CPT | Mod: LT | Performed by: FAMILY MEDICINE

## 2020-08-05 RX ORDER — TRIAMCINOLONE ACETONIDE 40 MG/ML
40 INJECTION, SUSPENSION INTRA-ARTICULAR; INTRAMUSCULAR ONCE
Status: COMPLETED | OUTPATIENT
Start: 2020-08-05 | End: 2020-08-05

## 2020-08-05 RX ADMIN — TRIAMCINOLONE ACETONIDE 40 MG: 40 INJECTION, SUSPENSION INTRA-ARTICULAR; INTRAMUSCULAR at 17:40

## 2020-08-05 ASSESSMENT — ENCOUNTER SYMPTOMS
FOCAL WEAKNESS: 0
VOMITING: 0
FEVER: 0
SHORTNESS OF BREATH: 0

## 2020-08-05 ASSESSMENT — FIBROSIS 4 INDEX: FIB4 SCORE: 0.37

## 2020-08-06 NOTE — PROGRESS NOTES
"Subjective:     Jonathan Edward Kimura is a 30 y.o. male who presents for Knee Pain (Left knee follow up, sore and no new symtoms.)    HPI  Pt presents for follow-up  Initial injury on 2/16/2020  MRI showed some signal in ACL suggesting a sprain  Patient attended physical therapy with Tay Clarke DPT for short time, however was put on hold due to COVID-19 pandemic  Patient continues to make improvements, however slowly  When he does get pain, continues to be more along the medial aspect  Has good days and bad days, and has times that he is pain-free  Pain stays localized in the knee and does not radiate to the foot or hip  No new falls or injuries since last visit  Feels he is ready to advance his activities at work    Review of Systems   Constitutional: Negative for fever.   Respiratory: Negative for shortness of breath.    Cardiovascular: Negative for chest pain.   Gastrointestinal: Negative for vomiting.   Skin: Negative for rash.   Neurological: Negative for focal weakness.       PMH:  has a past medical history of Chickenpox.  MEDS: No current outpatient medications on file.    Current Facility-Administered Medications:   •  triamcinolone acetonide (KENALOG-40) injection 40 mg, 40 mg, Intra-articular, Once, Geovanny Aldana M.D.  ALLERGIES: No Known Allergies  SURGHX: No past surgical history on file.  SOCHX:  reports that he has been smoking cigarettes. He started smoking about 9 years ago. He has a 2.25 pack-year smoking history. His smokeless tobacco use includes chew. He reports current alcohol use. He reports that he does not use drugs.  FH: Family history was reviewed, not contributing to acute injury     Objective:   /72 (BP Location: Left arm, Patient Position: Sitting, BP Cuff Size: Adult)   Pulse 74   Temp 37.1 °C (98.7 °F) (Temporal)   Resp 14   Ht 1.778 m (5' 10\")   Wt 80.7 kg (178 lb)   SpO2 98%   BMI 25.54 kg/m²     Physical Exam  Constitutional:       General: He is not in " acute distress.     Appearance: He is well-developed. He is not diaphoretic.   HENT:      Head: Normocephalic and atraumatic.   Musculoskeletal:      Comments: Left knee  Appearance - No bruising, erythema, or deformity appreciated  Palpation - +Mild tenderness to palpation along lateral joint line, lateral knee, lateral quad, patellar tendon  ROM - FROM without crepitus  Strength - 5/5 throughout  Neuro - Sensation equal and intact bilaterally   Skin:     General: Skin is warm and dry.      Findings: No rash.   Neurological:      Mental Status: He is alert and oriented to person, place, and time.   Psychiatric:         Mood and Affect: Mood normal.         Behavior: Behavior normal.         Thought Content: Thought content normal.         Judgment: Judgment normal.     Knee injection   First, a verbal consent and a verbal time-out were done, explaining the risks and benefits of the procedure. Then the patient was placed in a seated position.  Anterior lateral joint line portals were identified. The skin was cleaned with alcohol and the clean, no touch technique was used with a 25-gauge 1.5-inch needle. A combination of 5 mL of 1% lidocaine without epinephrine and 1 milliliter of Kenalog 40 mg/mL was injected into the left knee. The patient tolerated the procedure well and there were no immediate post injection complications. Hemostasis was then achieved with gentle pressure and a Band-Aid was placed over the lesion.  Post injection instructions for range of motion, ice, activity modification, signs of infection, emergency precautions were discussed with the patient.    Assessment/Plan:   Assessment    1. Sprain of anterior cruciate ligament of left knee, sequela  - triamcinolone acetonide (KENALOG-40) injection 40 mg    2. Contusion of left knee, subsequent encounter  - triamcinolone acetonide (KENALOG-40) injection 40 mg    Patient is a 30-year-old male with knee injury.  Continues to recover slowly.  Because of  his slow recovery, did discuss risks and benefits of corticosteroid injection.  Patient opted to try injection and, as above, tolerated well without acute complication.  We will plan to follow-up in a month to monitor his progress.

## 2020-09-09 ENCOUNTER — OFFICE VISIT (OUTPATIENT)
Dept: MEDICAL GROUP | Facility: CLINIC | Age: 30
End: 2020-09-09
Payer: COMMERCIAL

## 2020-09-09 VITALS
SYSTOLIC BLOOD PRESSURE: 106 MMHG | HEIGHT: 70 IN | RESPIRATION RATE: 14 BRPM | DIASTOLIC BLOOD PRESSURE: 74 MMHG | TEMPERATURE: 98.2 F | WEIGHT: 178 LBS | HEART RATE: 70 BPM | BODY MASS INDEX: 25.48 KG/M2 | OXYGEN SATURATION: 97 %

## 2020-09-09 DIAGNOSIS — M25.562 CHRONIC PAIN OF LEFT KNEE: ICD-10-CM

## 2020-09-09 DIAGNOSIS — S83.512S SPRAIN OF ANTERIOR CRUCIATE LIGAMENT OF LEFT KNEE, SEQUELA: ICD-10-CM

## 2020-09-09 DIAGNOSIS — S80.02XD CONTUSION OF LEFT KNEE, SUBSEQUENT ENCOUNTER: ICD-10-CM

## 2020-09-09 DIAGNOSIS — G89.29 CHRONIC PAIN OF LEFT KNEE: ICD-10-CM

## 2020-09-09 PROCEDURE — 99213 OFFICE O/P EST LOW 20 MIN: CPT | Performed by: FAMILY MEDICINE

## 2020-09-09 ASSESSMENT — ENCOUNTER SYMPTOMS
FOCAL WEAKNESS: 0
FEVER: 0
SHORTNESS OF BREATH: 0
VOMITING: 0

## 2020-09-09 ASSESSMENT — FIBROSIS 4 INDEX: FIB4 SCORE: 0.37

## 2020-09-10 NOTE — PROGRESS NOTES
"Subjective:     Jonathan Edward Kimura is a 30 y.o. male who presents for Knee Pain (Left knee pain follow up.)    HPI  Pt presents for follow-up  Initial injury on 2/16/2020  MRI showed some signal in ACL suggesting a sprain  Patient attended physical therapy with Tay Clarke DPT for short time, however was put on hold due to COVID-19 pandemic    Today, pt feels he is doing well   Has good days and bad days   Still gets soreness at the end of the day if he overdoes it   Had injection with corticosteroids which helped temporarily, however feels the effects have already worn off  Pain is more in the lateral aspect of knee    Review of Systems   Constitutional: Negative for fever.   Respiratory: Negative for shortness of breath.    Cardiovascular: Negative for chest pain.   Gastrointestinal: Negative for vomiting.   Skin: Negative for rash.   Neurological: Negative for focal weakness.     PMH:  has a past medical history of Chickenpox.  MEDS: No daily meds   ALLERGIES: No Known Allergies  SURGHX: No past surgical history on file.  SOCHX:  reports that he has been smoking cigarettes. He started smoking about 9 years ago. He has a 2.25 pack-year smoking history. His smokeless tobacco use includes chew. He reports current alcohol use. He reports that he does not use drugs.  FH: Family history was reviewed, not contributing to acute injury      Objective:   /74 (BP Location: Left arm, Patient Position: Sitting, BP Cuff Size: Adult)   Pulse 70   Temp 36.8 °C (98.2 °F) (Temporal)   Resp 14   Ht 1.778 m (5' 10\")   Wt 80.7 kg (178 lb)   SpO2 97%   BMI 25.54 kg/m²     Physical Exam  Constitutional:       General: He is not in acute distress.     Appearance: He is well-developed. He is not diaphoretic.   HENT:      Head: Normocephalic and atraumatic.   Musculoskeletal:      Comments: Left knee  Appearance - No bruising, erythema, or deformity appreciated  Palpation - +Mild tenderness throughout lateral knee " and along joint lines   ROM - FROM without crepitus  Strength - 5/5 throughout  Neuro - Sensation equal and intact bilaterally   Skin:     General: Skin is warm and dry.      Findings: No rash.   Neurological:      Mental Status: He is alert and oriented to person, place, and time.   Psychiatric:         Behavior: Behavior normal.         Thought Content: Thought content normal.         Judgment: Judgment normal.       Assessment/Plan:   Assessment    1. Sprain of anterior cruciate ligament of left knee, sequela  - REFERRAL TO ORTHOPEDICS    2. Chronic pain of left knee  - REFERRAL TO ORTHOPEDICS    3. Contusion of left knee, subsequent encounter  - REFERRAL TO ORTHOPEDICS    Patient with continued left knee pain which is been present for about 7 months now.  Advised that he is not healing as expected and would advise second opinion consult with orthopedics to discuss possible surgical interventions.  Advised that he could have a tiny tear in meniscus or cartilage which is symptomatic and not well visualized on MRI.  Patient is agreeable to this and will plan to see orthopedics in the next few weeks to see if any surgical interventions may help him fully resolve.

## 2020-10-07 ENCOUNTER — OFFICE VISIT (OUTPATIENT)
Dept: MEDICAL GROUP | Facility: CLINIC | Age: 30
End: 2020-10-07
Payer: COMMERCIAL

## 2020-10-07 VITALS
DIASTOLIC BLOOD PRESSURE: 80 MMHG | OXYGEN SATURATION: 98 % | HEART RATE: 72 BPM | RESPIRATION RATE: 14 BRPM | SYSTOLIC BLOOD PRESSURE: 120 MMHG | WEIGHT: 178 LBS | BODY MASS INDEX: 25.48 KG/M2 | HEIGHT: 70 IN | TEMPERATURE: 98.6 F

## 2020-10-07 DIAGNOSIS — G89.29 CHRONIC PAIN OF LEFT KNEE: ICD-10-CM

## 2020-10-07 DIAGNOSIS — S83.512S SPRAIN OF ANTERIOR CRUCIATE LIGAMENT OF LEFT KNEE, SEQUELA: ICD-10-CM

## 2020-10-07 DIAGNOSIS — M25.562 CHRONIC PAIN OF LEFT KNEE: ICD-10-CM

## 2020-10-07 PROCEDURE — 99213 OFFICE O/P EST LOW 20 MIN: CPT | Performed by: FAMILY MEDICINE

## 2020-10-07 ASSESSMENT — ENCOUNTER SYMPTOMS
VOMITING: 0
SORE THROAT: 0
COUGH: 0
FEVER: 0

## 2020-10-07 ASSESSMENT — FIBROSIS 4 INDEX: FIB4 SCORE: 0.37

## 2020-10-08 NOTE — PROGRESS NOTES
"Subjective:     Jonathan Edward Kimura is a 30 y.o. male who presents for Knee Pain (The patient is here for a follow up on the left knee pain, not better,)    HPI  Pt presents for follow-up  Initial injury on 2/16/2020  MRI showed some signal in ACL suggesting a sprain  Patient attended physical therapy with Tay Clarke DPT for short time, however was put on hold due to COVID-19 pandemic  Patient was not progressing as expected and was referred to orthopedic surgery  Patient saw Dr. Maty Singleton and is scheduled for arthroscopic surgery on 10/15/2020  Overall, knee pain not improving much  Able to walk around fine, however when trying to exert himself, run, or lift heavy things he has increased pain    Review of Systems   Constitutional: Negative for fever.   HENT: Negative for sore throat.    Respiratory: Negative for cough.    Cardiovascular: Negative for chest pain.   Gastrointestinal: Negative for vomiting.   Musculoskeletal: Positive for joint pain.   Skin: Negative for rash.     PMH:  has a past medical history of Chickenpox.  MEDS: No daily meds  ALLERGIES: No Known Allergies  SURGHX: History reviewed. No pertinent surgical history.  SOCHX:  reports that he has been smoking cigarettes. He started smoking about 9 years ago. He has a 2.25 pack-year smoking history. His smokeless tobacco use includes chew. He reports current alcohol use. He reports that he does not use drugs.  FH: Family history was reviewed, not contributing to acute knee injury     Objective:   /80 (BP Location: Left arm, Patient Position: Sitting, BP Cuff Size: Adult)   Pulse 72   Temp 37 °C (98.6 °F) (Temporal)   Resp 14   Ht 1.778 m (5' 10\")   Wt 80.7 kg (178 lb)   SpO2 98%   BMI 25.54 kg/m²     Physical Exam  Constitutional:       General: He is not in acute distress.     Appearance: He is well-developed. He is not diaphoretic.   HENT:      Head: Normocephalic and atraumatic.   Skin:     General: Skin is warm and " dry.      Findings: No rash.   Neurological:      Mental Status: He is alert and oriented to person, place, and time.   Psychiatric:         Behavior: Behavior normal.         Thought Content: Thought content normal.         Judgment: Judgment normal.       Assessment/Plan:   Assessment    1. Sprain of anterior cruciate ligament of left knee, sequela    2. Chronic pain of left knee    Patient with chronic left knee pain after an acute injury.  Has not improved as expected for this type of injury.  He is scheduled for arthroscopic surgery with Dr. Singleton next week.  His paperwork was updated and he will follow-up in this office on an as-needed basis.  Transfer care to Dr. Singleton.

## 2020-10-13 ENCOUNTER — APPOINTMENT (OUTPATIENT)
Dept: ADMISSIONS | Facility: MEDICAL CENTER | Age: 30
End: 2020-10-13
Attending: ORTHOPAEDIC SURGERY
Payer: COMMERCIAL

## 2020-10-13 DIAGNOSIS — Z01.812 PRE-OPERATIVE LABORATORY EXAMINATION: ICD-10-CM

## 2020-10-13 RX ORDER — IBUPROFEN 200 MG
200 TABLET ORAL EVERY 6 HOURS PRN
Status: ON HOLD | COMMUNITY
End: 2020-10-15

## 2020-10-13 RX ORDER — ACETAMINOPHEN 325 MG/1
650 TABLET ORAL EVERY 4 HOURS PRN
COMMUNITY

## 2020-10-13 RX ORDER — DIPHENHYDRAMINE HCL 25 MG
25 TABLET ORAL EVERY 6 HOURS PRN
COMMUNITY

## 2020-10-14 ENCOUNTER — PRE-ADMISSION TESTING (OUTPATIENT)
Dept: ADMISSIONS | Facility: MEDICAL CENTER | Age: 30
End: 2020-10-14
Attending: ORTHOPAEDIC SURGERY
Payer: COMMERCIAL

## 2020-10-14 DIAGNOSIS — Z01.812 PRE-OPERATIVE LABORATORY EXAMINATION: ICD-10-CM

## 2020-10-14 LAB
COVID ORDER STATUS COVID19: NORMAL
SARS-COV+SARS-COV-2 AG RESP QL IA.RAPID: NOTDETECTED
SPECIMEN SOURCE: NORMAL

## 2020-10-14 PROCEDURE — 87426 SARSCOV CORONAVIRUS AG IA: CPT

## 2020-10-15 ENCOUNTER — ANESTHESIA EVENT (OUTPATIENT)
Dept: SURGERY | Facility: MEDICAL CENTER | Age: 30
End: 2020-10-15
Payer: COMMERCIAL

## 2020-10-15 ENCOUNTER — ANESTHESIA (OUTPATIENT)
Dept: SURGERY | Facility: MEDICAL CENTER | Age: 30
End: 2020-10-15
Payer: COMMERCIAL

## 2020-10-15 ENCOUNTER — HOSPITAL ENCOUNTER (OUTPATIENT)
Facility: MEDICAL CENTER | Age: 30
End: 2020-10-15
Attending: ORTHOPAEDIC SURGERY | Admitting: ORTHOPAEDIC SURGERY
Payer: COMMERCIAL

## 2020-10-15 VITALS
TEMPERATURE: 96.8 F | DIASTOLIC BLOOD PRESSURE: 66 MMHG | HEART RATE: 74 BPM | HEIGHT: 70 IN | WEIGHT: 176.15 LBS | RESPIRATION RATE: 16 BRPM | OXYGEN SATURATION: 97 % | BODY MASS INDEX: 25.22 KG/M2 | SYSTOLIC BLOOD PRESSURE: 105 MMHG

## 2020-10-15 PROCEDURE — 502580 HCHG PACK, KNEE ARTHROSCOPY: Performed by: ORTHOPAEDIC SURGERY

## 2020-10-15 PROCEDURE — 700111 HCHG RX REV CODE 636 W/ 250 OVERRIDE (IP): Performed by: ANESTHESIOLOGY

## 2020-10-15 PROCEDURE — 160048 HCHG OR STATISTICAL LEVEL 1-5: Performed by: ORTHOPAEDIC SURGERY

## 2020-10-15 PROCEDURE — 160009 HCHG ANES TIME/MIN: Performed by: ORTHOPAEDIC SURGERY

## 2020-10-15 PROCEDURE — 160029 HCHG SURGERY MINUTES - 1ST 30 MINS LEVEL 4: Performed by: ORTHOPAEDIC SURGERY

## 2020-10-15 PROCEDURE — 500028 HCHG ARTHROWAND TURBOVAC 3.5/90 SUCT.: Performed by: ORTHOPAEDIC SURGERY

## 2020-10-15 PROCEDURE — 160002 HCHG RECOVERY MINUTES (STAT): Performed by: ORTHOPAEDIC SURGERY

## 2020-10-15 PROCEDURE — A6403 STERILE GAUZE>16 <= 48 SQ IN: HCPCS | Performed by: ORTHOPAEDIC SURGERY

## 2020-10-15 PROCEDURE — 160036 HCHG PACU - EA ADDL 30 MINS PHASE I: Performed by: ORTHOPAEDIC SURGERY

## 2020-10-15 PROCEDURE — 700101 HCHG RX REV CODE 250: Performed by: ANESTHESIOLOGY

## 2020-10-15 PROCEDURE — 160025 RECOVERY II MINUTES (STATS): Performed by: ORTHOPAEDIC SURGERY

## 2020-10-15 PROCEDURE — 160041 HCHG SURGERY MINUTES - EA ADDL 1 MIN LEVEL 4: Performed by: ORTHOPAEDIC SURGERY

## 2020-10-15 PROCEDURE — 700111 HCHG RX REV CODE 636 W/ 250 OVERRIDE (IP): Performed by: ORTHOPAEDIC SURGERY

## 2020-10-15 PROCEDURE — 160046 HCHG PACU - 1ST 60 MINS PHASE II: Performed by: ORTHOPAEDIC SURGERY

## 2020-10-15 PROCEDURE — 700101 HCHG RX REV CODE 250: Performed by: ORTHOPAEDIC SURGERY

## 2020-10-15 PROCEDURE — 160035 HCHG PACU - 1ST 60 MINS PHASE I: Performed by: ORTHOPAEDIC SURGERY

## 2020-10-15 PROCEDURE — A6223 GAUZE >16<=48 NO W/SAL W/O B: HCPCS | Performed by: ORTHOPAEDIC SURGERY

## 2020-10-15 PROCEDURE — 700105 HCHG RX REV CODE 258: Performed by: ORTHOPAEDIC SURGERY

## 2020-10-15 PROCEDURE — 700102 HCHG RX REV CODE 250 W/ 637 OVERRIDE(OP): Performed by: ANESTHESIOLOGY

## 2020-10-15 PROCEDURE — 501838 HCHG SUTURE GENERAL: Performed by: ORTHOPAEDIC SURGERY

## 2020-10-15 PROCEDURE — A9270 NON-COVERED ITEM OR SERVICE: HCPCS | Performed by: ANESTHESIOLOGY

## 2020-10-15 RX ORDER — LIDOCAINE HYDROCHLORIDE 10 MG/ML
INJECTION, SOLUTION INFILTRATION; PERINEURAL
Status: DISCONTINUED | OUTPATIENT
Start: 2020-10-15 | End: 2020-10-15 | Stop reason: HOSPADM

## 2020-10-15 RX ORDER — MEPERIDINE HYDROCHLORIDE 25 MG/ML
12.5 INJECTION INTRAMUSCULAR; INTRAVENOUS; SUBCUTANEOUS
Status: DISCONTINUED | OUTPATIENT
Start: 2020-10-15 | End: 2020-10-15 | Stop reason: HOSPADM

## 2020-10-15 RX ORDER — HYDROMORPHONE HYDROCHLORIDE 1 MG/ML
0.4 INJECTION, SOLUTION INTRAMUSCULAR; INTRAVENOUS; SUBCUTANEOUS
Status: DISCONTINUED | OUTPATIENT
Start: 2020-10-15 | End: 2020-10-15 | Stop reason: HOSPADM

## 2020-10-15 RX ORDER — HYDROMORPHONE HYDROCHLORIDE 1 MG/ML
0.1 INJECTION, SOLUTION INTRAMUSCULAR; INTRAVENOUS; SUBCUTANEOUS
Status: DISCONTINUED | OUTPATIENT
Start: 2020-10-15 | End: 2020-10-15 | Stop reason: HOSPADM

## 2020-10-15 RX ORDER — HYDROMORPHONE HYDROCHLORIDE 1 MG/ML
0.2 INJECTION, SOLUTION INTRAMUSCULAR; INTRAVENOUS; SUBCUTANEOUS
Status: DISCONTINUED | OUTPATIENT
Start: 2020-10-15 | End: 2020-10-15 | Stop reason: HOSPADM

## 2020-10-15 RX ORDER — OXYCODONE HCL 5 MG/5 ML
5 SOLUTION, ORAL ORAL
Status: COMPLETED | OUTPATIENT
Start: 2020-10-15 | End: 2020-10-15

## 2020-10-15 RX ORDER — LIDOCAINE HYDROCHLORIDE 20 MG/ML
INJECTION, SOLUTION EPIDURAL; INFILTRATION; INTRACAUDAL; PERINEURAL PRN
Status: DISCONTINUED | OUTPATIENT
Start: 2020-10-15 | End: 2020-10-15 | Stop reason: SURG

## 2020-10-15 RX ORDER — BUPIVACAINE HYDROCHLORIDE AND EPINEPHRINE 2.5; 5 MG/ML; UG/ML
INJECTION, SOLUTION EPIDURAL; INFILTRATION; INTRACAUDAL; PERINEURAL
Status: DISCONTINUED | OUTPATIENT
Start: 2020-10-15 | End: 2020-10-15 | Stop reason: HOSPADM

## 2020-10-15 RX ORDER — MORPHINE SULFATE 0.5 MG/ML
INJECTION, SOLUTION EPIDURAL; INTRATHECAL; INTRAVENOUS
Status: DISCONTINUED | OUTPATIENT
Start: 2020-10-15 | End: 2020-10-15 | Stop reason: HOSPADM

## 2020-10-15 RX ORDER — ONDANSETRON 2 MG/ML
4 INJECTION INTRAMUSCULAR; INTRAVENOUS
Status: DISCONTINUED | OUTPATIENT
Start: 2020-10-15 | End: 2020-10-15 | Stop reason: HOSPADM

## 2020-10-15 RX ORDER — SODIUM CHLORIDE, SODIUM LACTATE, POTASSIUM CHLORIDE, CALCIUM CHLORIDE 600; 310; 30; 20 MG/100ML; MG/100ML; MG/100ML; MG/100ML
INJECTION, SOLUTION INTRAVENOUS CONTINUOUS
Status: DISCONTINUED | OUTPATIENT
Start: 2020-10-15 | End: 2020-10-15 | Stop reason: HOSPADM

## 2020-10-15 RX ORDER — ONDANSETRON 2 MG/ML
INJECTION INTRAMUSCULAR; INTRAVENOUS PRN
Status: DISCONTINUED | OUTPATIENT
Start: 2020-10-15 | End: 2020-10-15 | Stop reason: SURG

## 2020-10-15 RX ORDER — DIPHENHYDRAMINE HYDROCHLORIDE 50 MG/ML
12.5 INJECTION INTRAMUSCULAR; INTRAVENOUS
Status: DISCONTINUED | OUTPATIENT
Start: 2020-10-15 | End: 2020-10-15 | Stop reason: HOSPADM

## 2020-10-15 RX ORDER — OXYCODONE HCL 5 MG/5 ML
10 SOLUTION, ORAL ORAL
Status: COMPLETED | OUTPATIENT
Start: 2020-10-15 | End: 2020-10-15

## 2020-10-15 RX ORDER — KETOROLAC TROMETHAMINE 30 MG/ML
INJECTION, SOLUTION INTRAMUSCULAR; INTRAVENOUS PRN
Status: DISCONTINUED | OUTPATIENT
Start: 2020-10-15 | End: 2020-10-15 | Stop reason: SURG

## 2020-10-15 RX ORDER — HALOPERIDOL 5 MG/ML
1 INJECTION INTRAMUSCULAR
Status: DISCONTINUED | OUTPATIENT
Start: 2020-10-15 | End: 2020-10-15 | Stop reason: HOSPADM

## 2020-10-15 RX ORDER — CEFAZOLIN SODIUM 1 G/3ML
INJECTION, POWDER, FOR SOLUTION INTRAMUSCULAR; INTRAVENOUS PRN
Status: DISCONTINUED | OUTPATIENT
Start: 2020-10-15 | End: 2020-10-15 | Stop reason: SURG

## 2020-10-15 RX ADMIN — OXYCODONE HYDROCHLORIDE 10 MG: 5 SOLUTION ORAL at 12:15

## 2020-10-15 RX ADMIN — PROPOFOL 200 MG: 10 INJECTION, EMULSION INTRAVENOUS at 10:12

## 2020-10-15 RX ADMIN — SODIUM CHLORIDE, POTASSIUM CHLORIDE, SODIUM LACTATE AND CALCIUM CHLORIDE: 600; 310; 30; 20 INJECTION, SOLUTION INTRAVENOUS at 09:15

## 2020-10-15 RX ADMIN — KETOROLAC TROMETHAMINE 30 MG: 30 INJECTION, SOLUTION INTRAMUSCULAR at 11:15

## 2020-10-15 RX ADMIN — FENTANYL CITRATE 25 MCG: 50 INJECTION, SOLUTION INTRAMUSCULAR; INTRAVENOUS at 12:14

## 2020-10-15 RX ADMIN — LIDOCAINE HYDROCHLORIDE 100 MG: 20 INJECTION, SOLUTION EPIDURAL; INFILTRATION; INTRACAUDAL; PERINEURAL at 10:12

## 2020-10-15 RX ADMIN — MIDAZOLAM HYDROCHLORIDE 5 MG: 1 INJECTION, SOLUTION INTRAMUSCULAR; INTRAVENOUS at 10:12

## 2020-10-15 RX ADMIN — ONDANSETRON 4 MG: 2 INJECTION INTRAMUSCULAR; INTRAVENOUS at 11:15

## 2020-10-15 RX ADMIN — LIDOCAINE HYDROCHLORIDE 0.5 ML: 10 INJECTION, SOLUTION INFILTRATION; PERINEURAL at 09:11

## 2020-10-15 RX ADMIN — CEFAZOLIN 1 G: 1 INJECTION, POWDER, FOR SOLUTION INTRAVENOUS at 10:14

## 2020-10-15 RX ADMIN — FENTANYL CITRATE 250 MCG: 50 INJECTION, SOLUTION INTRAMUSCULAR; INTRAVENOUS at 10:12

## 2020-10-15 RX ADMIN — WATER 15 ML: 100 IRRIGANT IRRIGATION at 09:11

## 2020-10-15 ASSESSMENT — PAIN DESCRIPTION - PAIN TYPE
TYPE: SURGICAL PAIN
TYPE: SURGICAL PAIN

## 2020-10-15 ASSESSMENT — FIBROSIS 4 INDEX: FIB4 SCORE: 0.37

## 2020-10-15 NOTE — OR NURSING
1056:  To PACU from OR via gurney, sleeping, respirations spontaneous and non-labored via OPA. Pt requires jaw thrust for oxygenation    1127:  Rouses spontaneously.  VSS.  No c/o pain or nausea.        1205:  Pt states his pain is 7/10    1214:  Pain med given.  Tolerating sips of water.    1225:  Pt resting with eyes closed.  VSS.      1250:  Pt sleeping VSS    1320:  No change    1340:  Woke pt up, states pain is tolerable, no nausea, VSS, meets criteria for stage 2.

## 2020-10-15 NOTE — DISCHARGE INSTRUCTIONS
ACTIVITY: Rest and take it easy for the first 24 hours.  A responsible adult is recommended to remain with you during that time.  It is normal to feel sleepy.  We encourage you to not do anything that requires balance, judgment or coordination.    MILD FLU-LIKE SYMPTOMS ARE NORMAL. YOU MAY EXPERIENCE GENERALIZED MUSCLE ACHES, THROAT IRRITATION, HEADACHE AND/OR SOME NAUSEA.    FOR 24 HOURS DO NOT:  Drive, operate machinery or run household appliances.  Drink beer or alcoholic beverages.   Make important decisions or sign legal documents.    SPECIAL INSTRUCTIONS:   Activity is to be weight bearing as tolerated. Ice and elevate.  Keep dressings clean and dry.  May remove dressings and shower in 48 hours.    DIET: To avoid nausea, slowly advance diet as tolerated, avoiding spicy or greasy foods for the first day.  Add more substantial food to your diet according to your physician's instructions.   INCREASE FLUIDS AND FIBER TO AVOID CONSTIPATION.      FOLLOW-UP APPOINTMENT:  A follow-up appointment should be arranged with your doctor.    You should CALL YOUR PHYSICIAN if you develop:  Fever greater than 101 degrees F.  Pain not relieved by medication, or persistent nausea or vomiting.  Excessive bleeding (blood soaking through dressing) or unexpected drainage from the wound.  Extreme redness or swelling around the incision site, drainage of pus or foul smelling drainage.  Inability to urinate or empty your bladder within 8 hours.    You should call 911 if you develop problems with breathing or chest pain.    If you are unable to contact your doctor or surgical center, you should go to the nearest emergency room or urgent care center.      Physician's telephone #: [phone]    If any questions arise, call your doctor.  If your doctor is not available, please feel free to call the Surgical Center at [phone]. The Contact Center is open Monday through Friday 7AM to 5PM and may speak to a nurse at [phone], or  toll free at [phone].     A registered nurse may call you a few days after your surgery to see how you are doing after your procedure.    MEDICATIONS: Resume taking daily medication.  Take prescribed pain medication with food.  If no medication is prescribed, you may take non-aspirin pain medication if needed.  PAIN MEDICATION CAN BE VERY CONSTIPATING.  Take a stool softener or laxative such as senokot, pericolace, or milk of magnesia if needed.    Prescription given for Zofran, Norco, Asprin and Naproxen.      Last pain medication (Oxycodone 10mg) given at 12:15 PM .    If your physician has prescribed pain medication that includes Acetaminophen (Tylenol), do not take additional Acetaminophen (Tylenol) while taking the prescribed medication.    Depression / Suicide Risk    As you are discharged from this [facility] facility, it is important to learn how to keep safe from harming yourself.    Recognize the warning signs:  · Abrupt changes in personality, positive or negative- including increase in energy   · Giving away possessions  · Change in eating patterns- significant weight changes-  positive or negative  · Change in sleeping patterns- unable to sleep or sleeping all the time   · Unwillingness or inability to communicate  · Depression  · Unusual sadness, discouragement and loneliness  · Talk of wanting to die  · Neglect of personal appearance   · Rebelliousness- reckless behavior  · Withdrawal from people/activities they love  · Confusion- inability to concentrate     If you or a loved one observes any of these behaviors or has concerns about self-harm, here's what you can do:  · Talk about it- your feelings and reasons for harming yourself  · Remove any means that you might use to hurt yourself (examples: pills, rope, extension cords, firearm)  · Get professional help from the community (Mental Health, Substance Abuse, psychological counseling)  · Do not be alone:Call your Safe Contact- someone whom  you trust who will be there for you.  · Call your local CRISIS HOTLINE 074-8420 or 957-169-6646  · Call your local Children's Mobile Crisis Response Team [place] (245) 397-4689 or www.[employer]  · Call the toll free National Suicide Prevention Hotlines   · National Suicide Prevention Lifeline 260-613-KSJV (9204)  · National [employer] Line Network 800-SUICIDE (823-3488)

## 2020-10-15 NOTE — OR NURSING
1138 Report received from Janes PENG. Pt on 6L of O2 via mask.     1149 Pt denies any pain or nausea at this time.     1153 Called and updated friend.     1156 Report given to Janes PENG.

## 2020-10-15 NOTE — ANESTHESIA POSTPROCEDURE EVALUATION
Patient: Jonathan Edward Kimura    Procedure Summary     Date: 10/15/20 Room / Location: [place] [address] / SURGERY [facility]    Anesthesia Start: 1007 Anesthesia Stop: 1116    Procedure: ARTHROSCOPY, KNEE - DIAGNOSTIC WITH FAT PAD DEBRIDEMENT, synovectomy  (Left Knee) Diagnosis: (HYPERTROPHY OF INFRAPATELLAR FAT PAD, KNEE PAIN LEFT)    Surgeon: Maty Singleton M.D. Responsible Provider: Ej Moreno M.D.    Anesthesia Type: general ASA Status: 1          Final Anesthesia Type: general  Last vitals  BP   Blood Pressure: 103/70    Temp   36.2 °C (97.2 °F)    Pulse   Pulse: 76   Resp   16    SpO2          Anesthesia Post Evaluation    Patient location during evaluation: PACU  Patient participation: complete - patient participated  Level of consciousness: awake and alert    Airway patency: patent  Anesthetic complications: no  Cardiovascular status: hemodynamically stable  Respiratory status: acceptable  Hydration status: euvolemic    PONV: none           Nurse Pain Score: 6 (NPRS)

## 2020-10-15 NOTE — ANESTHESIA TIME REPORT
Anesthesia Start and Stop Event Times     Date Time Event    10/15/2020 1007 Ready for Procedure     1007 Anesthesia Start     1116 Anesthesia Stop        Responsible Staff  10/15/20    Name Role Begin End    Ej Moreno M.D. Anesth 1007 1116        Preop Diagnosis (Free Text):  Pre-op Diagnosis     HYPERTROPHY OF INFRAPATELLAR FAT PAD, KNEE PAIN LEFT        Preop Diagnosis (Codes):    Post op Diagnosis  Knee pain      Premium Reason  C. Post-1st,2nd,3rd,OB,RTC    Comments:

## 2020-10-15 NOTE — OR SURGEON
Immediate Post OP Note    PreOp Diagnosis: L knee fat pad impingement and synovial hypertrophy    PostOp Diagnosis: Same    Procedure(s):  ARTHROSCOPY, KNEE - DIAGNOSTIC WITH FAT PAD DEBRIDEMENT, synovectomy  - Wound Class: Clean    Surgeon(s):  Maty Singleton M.D.    Anesthesiologist/Type of Anesthesia:  Anesthesiologist: Ej Moreno M.D./General    Surgical Staff:  Circulator: Bret Lema R.N.  Scrub Person: Thomas Berry    Specimens removed if any:  None    Estimated Blood Loss: <5 mL    Findings: fat pad impingement    Complications: none        10/15/2020 10:57 AM Maty Singleton M.D.

## 2020-10-15 NOTE — OP REPORT
DATE OF SERVICE:  10/15/2020    PREOPERATIVE DIAGNOSES:  1.  Left knee fat pad impingement.  2.  Left knee extensive synovial hypertrophy.    POSTOPERATIVE DIAGNOSES:  1.  Left knee fat pad impingement.  2.  Left knee extensive synovial hypertrophy.    PROCEDURES:  1.  Left knee diagnostic arthroscopy.  2.  Left knee extensive synovial debridement with fat pad debridement.    ATTENDING SURGEON:  Maty Singleton MD    ANESTHESIA:  General.    ANESTHESIOLOGIST:  Ej Moreno MD    ASSISTANT:  None.    TOURNIQUET TIME:  None.    SPECIMENS:  None.    ESTIMATED BLOOD LOSS:  Less than 5 mL.    IMPLANTS:  None.    FINDINGS:  There was extensive synovial hypertrophy throughout all 3   compartments of the knee as well as evidence of fat pad impingement   anteriorly.  The cartilage throughout the knee was pristine.  The medial and   lateral menisci were intact without tears.  The ACL and PCL were intact   without tears.    POSTOPERATIVE PLAN:  The patient will be weightbearing as tolerated and range   of motion as tolerated on his left lower extremity.  He will start physical   therapy in the next 2-3 days working on knee range of motion and quad   strengthening.  He will return to clinic in 7-10 days for suture removal and   wound check.    INDICATIONS FOR THE PROCEDURE:  The patient is a 30-year-old very active male   who presented to clinic with worsening anterior, medial, and lateral knee pain   for the past 9 months.  He states that this all started when he fell through   a roof and twisted his knee awkwardly.  Since that time, he states that he has   difficulty doing any deep squatting or any high-impact activities because of   knee pain.  He has tried and failed at this point extensive conservative   management over the last 9 months including cortisone injections, activity   modification, physical therapy, bracing, anti-inflammatories, ice and   elevation, and at this point wishes to proceed with surgery.  I had  a long   discussion with the patient regarding the risks and benefits of surgery   including but not limited to bleeding, infection, risk to surrounding   structures such as blood vessels or nerves, pain, scar, reoperation, and risks   with anesthesia such as stroke, heart attack, deep venous thrombosis,   pulmonary embolism, and death.  The patient understood the risks and benefits   and elected to proceed with surgery.    PROCEDURE IN DETAIL:  The patient was met in the preoperative holding area   where the correct left knee was marked with my initials.  He was then   transferred to the operating room where he was placed supine on the operating   room table with all his bony prominences well padded.  He received 2 grams of   preoperative Ancef.  He was intubated by the anesthesia team without   complications.  Preoperative exam under anesthesia revealed left knee with   skin intact.  There was no knee effusion.  He had full range of motion, 0-130   degrees.  He was stable to varus and valgus stress and had a negative Lachman,   negative posterior drawer.  The patient's left lower extremity was then   prepped and draped in the usual sterile fashion.  A preoperative timeout was   held, where all those in the room agreed upon the correct patient, the correct   site of surgery, and the correct surgery to be performed.    I began the procedure by injecting 40 mL of combination of 0.25% Marcaine with   epinephrine and 1% lidocaine into the left knee via the superolateral   approach.  I then made my anterolateral portal with #11 blade and inserted the   scope into the knee.  Upon entering the patellofemoral compartment, I noted   pristine cartilage of the patellofemoral compartment; however, there was   extensive synovial hypertrophy as well as fat pad impingement anteriorly.    There was quite a bit of synovitis as well.  There were no loose bodies in the   medial or lateral gutter.  The knee was then placed into full  extension with   valgus stress and I made my anteromedial portal under direct visualization   with spinal needle.  A probe was inserted into the medial compartment where I   probed it intact and stable medial meniscus without tears.  The cartilage of   the medial compartment was pristine.  The knee was then placed into 90 degrees   of flexion where I examined an intact and competent ACL and PCL that were   stable to probing.  The knee was then placed in a figure-of-four position   where again I examined pristine lateral meniscus without tears that was stable   to probing.  The cartilage of the lateral compartment was pristine.  At this   point, given the extensive synovial hypertrophy throughout all 3 compartments   and fat pad impingement, I proceeded with my extensive synovial debridement   and fat pad debridement.  I used a combination of a 4.0 mm sucker shaver and   Arthrocare wand to perform my extensive synovial debridement and fat pad   debridement.  I then copiously irrigated out the knee with arthroscopic fluid   using the 4.0 mm sucker shaver to remove all debris from the knee.  I then   placed a spinal needle in the superolateral aspect of the knee under direct   visualization for my postoperative pain injection.  The scope and fluid were   then removed from the knee.  Without complications, the portals were closed   with 3-0 Prolene.  I then injected a combination of local mixture and 5 mL of   Duramorph into the left knee via the previously placed spinal needle.    Steri-Strips followed by sterile dressings were applied and the patient was   awoken from anesthesia without complications.  Please note that all counts   were correct at the end of the case.       ____________________________________     MD BARBRA Roberto / MEÑO    DD:  10/15/2020 11:14:57  DT:  10/15/2020 12:20:14    D#:  [id number]  Job#:  [id number]

## 2020-10-15 NOTE — OR NURSING
1405 Report received from Wilmer PENG. Pt sitting up in recliner. Denies any pain or nausea at this time.  Called patient's ride.  1420 Discharge instructions given to pt and friend.   1425 Pt up to bathroom and able to void.  1427 IV dc'c. Pt discharged to home via wheelchair with friend.

## 2021-03-03 ENCOUNTER — HOSPITAL ENCOUNTER (EMERGENCY)
Facility: MEDICAL CENTER | Age: 31
End: 2021-03-04
Attending: EMERGENCY MEDICINE

## 2021-03-03 DIAGNOSIS — M54.50 ACUTE BILATERAL LOW BACK PAIN WITHOUT SCIATICA: ICD-10-CM

## 2021-03-03 PROCEDURE — A9270 NON-COVERED ITEM OR SERVICE: HCPCS | Performed by: EMERGENCY MEDICINE

## 2021-03-03 PROCEDURE — 96372 THER/PROPH/DIAG INJ SC/IM: CPT

## 2021-03-03 PROCEDURE — 99284 EMERGENCY DEPT VISIT MOD MDM: CPT

## 2021-03-03 PROCEDURE — 700102 HCHG RX REV CODE 250 W/ 637 OVERRIDE(OP): Performed by: EMERGENCY MEDICINE

## 2021-03-03 PROCEDURE — 700111 HCHG RX REV CODE 636 W/ 250 OVERRIDE (IP): Performed by: EMERGENCY MEDICINE

## 2021-03-03 RX ORDER — CYCLOBENZAPRINE HCL 10 MG
10 TABLET ORAL ONCE
Status: COMPLETED | OUTPATIENT
Start: 2021-03-03 | End: 2021-03-03

## 2021-03-03 RX ORDER — KETOROLAC TROMETHAMINE 30 MG/ML
30 INJECTION, SOLUTION INTRAMUSCULAR; INTRAVENOUS ONCE
Status: COMPLETED | OUTPATIENT
Start: 2021-03-03 | End: 2021-03-03

## 2021-03-03 RX ORDER — OXYCODONE HYDROCHLORIDE AND ACETAMINOPHEN 5; 325 MG/1; MG/1
2 TABLET ORAL ONCE
Status: COMPLETED | OUTPATIENT
Start: 2021-03-03 | End: 2021-03-03

## 2021-03-03 RX ADMIN — OXYCODONE HYDROCHLORIDE AND ACETAMINOPHEN 2 TABLET: 5; 325 TABLET ORAL at 23:42

## 2021-03-03 RX ADMIN — KETOROLAC TROMETHAMINE 30 MG: 30 INJECTION, SOLUTION INTRAMUSCULAR at 23:42

## 2021-03-03 RX ADMIN — CYCLOBENZAPRINE 10 MG: 10 TABLET, FILM COATED ORAL at 23:42

## 2021-03-03 ASSESSMENT — FIBROSIS 4 INDEX: FIB4 SCORE: 0.38

## 2021-03-03 NOTE — Clinical Note
Jonathan Kimura was seen and treated in our emergency department on 3/3/2021.  He may return to work on 03/08/2021.  No drill this weekend.     If you have any questions or concerns, please don't hesitate to call.      Will Li M.D.

## 2021-03-04 ENCOUNTER — HOSPITAL ENCOUNTER (OUTPATIENT)
Dept: RADIOLOGY | Facility: MEDICAL CENTER | Age: 31
End: 2021-03-04
Attending: EMERGENCY MEDICINE

## 2021-03-04 ENCOUNTER — APPOINTMENT (OUTPATIENT)
Dept: RADIOLOGY | Facility: MEDICAL CENTER | Age: 31
End: 2021-03-04
Attending: EMERGENCY MEDICINE

## 2021-03-04 VITALS
DIASTOLIC BLOOD PRESSURE: 68 MMHG | TEMPERATURE: 98 F | WEIGHT: 186 LBS | HEART RATE: 74 BPM | SYSTOLIC BLOOD PRESSURE: 121 MMHG | HEIGHT: 70 IN | BODY MASS INDEX: 26.63 KG/M2 | OXYGEN SATURATION: 95 % | RESPIRATION RATE: 18 BRPM

## 2021-03-04 PROCEDURE — 700111 HCHG RX REV CODE 636 W/ 250 OVERRIDE (IP): Performed by: EMERGENCY MEDICINE

## 2021-03-04 PROCEDURE — A9270 NON-COVERED ITEM OR SERVICE: HCPCS | Performed by: EMERGENCY MEDICINE

## 2021-03-04 PROCEDURE — 72100 X-RAY EXAM L-S SPINE 2/3 VWS: CPT

## 2021-03-04 PROCEDURE — 700102 HCHG RX REV CODE 250 W/ 637 OVERRIDE(OP): Performed by: EMERGENCY MEDICINE

## 2021-03-04 PROCEDURE — 72131 CT LUMBAR SPINE W/O DYE: CPT

## 2021-03-04 RX ORDER — DIAZEPAM 5 MG/1
5 TABLET ORAL ONCE
Status: COMPLETED | OUTPATIENT
Start: 2021-03-04 | End: 2021-03-04

## 2021-03-04 RX ORDER — OXYCODONE HYDROCHLORIDE AND ACETAMINOPHEN 5; 325 MG/1; MG/1
1 TABLET ORAL EVERY 4 HOURS PRN
Qty: 15 TABLET | Refills: 0 | Status: SHIPPED | OUTPATIENT
Start: 2021-03-04 | End: 2021-03-07

## 2021-03-04 RX ORDER — DIAZEPAM 5 MG/1
5 TABLET ORAL EVERY 8 HOURS PRN
Qty: 15 TABLET | Refills: 0 | Status: SHIPPED | OUTPATIENT
Start: 2021-03-04 | End: 2021-03-07

## 2021-03-04 RX ORDER — IBUPROFEN 600 MG/1
600 TABLET ORAL EVERY 6 HOURS PRN
Qty: 20 TABLET | Refills: 0 | Status: SHIPPED | OUTPATIENT
Start: 2021-03-04

## 2021-03-04 RX ORDER — HYDROMORPHONE HYDROCHLORIDE 1 MG/ML
1 INJECTION, SOLUTION INTRAMUSCULAR; INTRAVENOUS; SUBCUTANEOUS ONCE
Status: DISCONTINUED | OUTPATIENT
Start: 2021-03-04 | End: 2021-03-04

## 2021-03-04 RX ORDER — HYDROMORPHONE HYDROCHLORIDE 1 MG/ML
1 INJECTION, SOLUTION INTRAMUSCULAR; INTRAVENOUS; SUBCUTANEOUS ONCE
Status: COMPLETED | OUTPATIENT
Start: 2021-03-04 | End: 2021-03-04

## 2021-03-04 RX ADMIN — DIAZEPAM 5 MG: 5 TABLET ORAL at 00:45

## 2021-03-04 RX ADMIN — HYDROMORPHONE HYDROCHLORIDE 1 MG: 1 INJECTION, SOLUTION INTRAMUSCULAR; INTRAVENOUS; SUBCUTANEOUS at 01:47

## 2021-03-04 NOTE — ED NOTES
Patient is stable for discharge at this time, anticipatory guidance provided, close follow-up is encouraged, and ED return instructions have been detailed. Patient is both agreeable to the disposition and plan and discharged home in ambulatory state and in good condition.     Rx education provided, Pt verbalized understanding;    Pt has friend coming to pick him up to take home;    Narcotic paperwork signed;

## 2021-03-04 NOTE — ED NOTES
"Pt unable to sit up in [facility] on own at this time due to back Pn - stated \"I felt a shooting Pn in my back when I tried to sit up\", ERP notified;      "

## 2021-03-04 NOTE — ED PROVIDER NOTES
ED Provider Note    CHIEF COMPLAINT  No chief complaint on file.      HPI  Jonathan Edward Kimura is a 31 y.o. male who presents for evaluation of low back pain.  Patient notes that he slipped down some stairs and landed on his back once around 5 PM.  He states the pain was in the middle of his low back and was nonradiating.  It was tolerable at first however he laid down and took a nap.  When he woke up from that the pain was excruciating and it was difficult for him to move.  He notes no numbness or tingling to his lower extremities and has had no bowel or bladder dysfunction.  He states he has some tingling in the sides of his pelvic bone and indicates the iliac crest area.  He has no abdominal pain, chest pain, did not have any stated pain to his upper back, neck, or head.    REVIEW OF SYSTEMS  Constitutional: No fevers or chills  Skin: No rashes  HEENT: No sore throat, or runny nose  Neck: No neck pain, stiffness, or masses.  Chest: No pain   Pulm: No shortness of breath, cough, wheezing, stridor, or pain with inspiration/expiration  Gastrointestinal: No nausea, vomiting,  or abdominal pain.  Genitourinary: No dysuria or hematuria  Musculoskeletal: No recent trauma, pain, swelling, weakness  Neurologic: No motor changes. No confusion or disorientation.  Heme: No bleeding or bruising problems.   Immuno: No hx of recurrent infections      PAST MEDICAL HISTORY   has a past medical history of Chickenpox and Pain.    SOCIAL HISTORY  Social History     Tobacco Use   • Smoking status: Current Every Day Smoker     Packs/day: 0.25     Years: 9.00     Pack years: 2.25     Types: Cigarettes     Start date: 6/1/2011   • Smokeless tobacco: Current User     Types: Chew   Substance and Sexual Activity   • Alcohol use: Yes     Comment: couple per month   • Drug use: Never   • Sexual activity: Yes     Partners: Female     Birth control/protection: Condom       SURGICAL HISTORY   has a past surgical history that includes knee  "scope,diagnostic (Left, 10/15/2020).    CURRENT MEDICATIONS  Home Medications     Reviewed by La Lanier R.N. (Registered Nurse) on 03/03/21 at 2259  Med List Status: Not Addressed   Medication Last Dose Status   acetaminophen (TYLENOL) 325 MG Tab  Active   diphenhydrAMINE (BENADRYL) 25 MG Tab  Active   Glucosamine-Chondroitin (GLUCOSAMINE CHONDR COMPLEX PO)  Active                ALLERGIES  No Known Allergies    PHYSICAL EXAM  VITAL SIGNS: /68   Pulse 74   Temp 36.7 °C (98 °F) (Temporal)   Resp 18   Ht 1.778 m (5' 10\")   Wt 84.4 kg (186 lb)   SpO2 95%   BMI 26.69 kg/m²    Gen: Alert, occasionally grimacing.  Lying supine.  HEENT: No signs of trauma, Bilateral external ears normal, Nose normal. Conjunctiva normal, Non-icteric.    Cardiovascular: Regular rate and rhythm, no murmurs.  Capillary refill less than 3 seconds to all extremities, 2+ distal pulses.  Thorax & Lungs: Normal breath sounds, No respiratory distress, No wheezing bilateral chest rise  Abdomen: Bowel sounds normal, Soft, No tenderness, No masses, No pulsatile masses. No Guarding or rebound  Skin: Warm, Dry, No erythema, No rash noted to exposed areas.   Back: Mild tenderness to palpation diffusely in the along the lumbar musculature.  There is no evidence for spinous process injury and there are no step-offs, deformities, or crepitus noted from base of occiput to sacrum.  Extremities: Intact distal pulses, No edema.  5 out of 5 strength in ankle plantar and dorsiflexion bilaterally.  Neurologic: Alert , no facial droop, grossly normal coordination and strength  Psychiatric: Affect pleasant      LABS  Results for orders placed or performed in visit on 10/14/20   COVID/SARS CoV-2    Specimen: Nasal; Respirate   Result Value Ref Range    COVID Order Status Received    SARS-COV Antigen FLAVIA   Result Value Ref Range    SARS-CoV-2 Source NP Swab     SARS-COV ANTIGEN FLAVIA NotDetected Not-Detected       RADIOLOGY  CT-LSPINE W/O PLUS " RECONS   Final Result         1.  No acute traumatic bony injury of the lumbar spine.      DX-LUMBAR SPINE-2 OR 3 VIEWS   Final Result         1.  No acute traumatic bony injury of the lumbar spine.        I reviewed prescription monitoring program for patient's narcotic use before prescribing a scheduled drug.The patient will not drink alcohol nor drive with prescribed medications. The patient will return for new or worsening symptoms and is stable at the time of discharge.     The patient is referred to a primary physician for blood pressure management, diabetic screening, and for all other preventative health concerns.     In prescribing controlled substances to this patient, I certify that I have obtained and reviewed the medical history of the patient. I have also made a good betsy effort to obtain applicable records from other providers who have treated the patient and records did not demonstrate any increased risk of substance abuse that would prevent me from prescribing controlled substances.      I have conducted a physical exam and documented it. I have reviewed the patient's prescription history as maintained by the [place] Prescription Monitoring Program.      I have assessed the patient’s risk for abuse, dependency, and addiction using the validated Opioid Risk Tool available at https://www.mdcalc.com/ocbbot-uwao-vbzc-ort-narcotic-abuse.      Given the above, I believe the benefits of controlled substance therapy outweigh the risks. The reasons for prescribing controlled substances include in my professional opinion, controlled substances are the only reasonable choice for this patient because over-the-counter medications are unlikely to control the pain adequately. Accordingly, I have discussed the risk and benefits, treatment plan, and alternative therapies with the patient.     COURSE & MEDICAL DECISION MAKING  Patient arrives for evaluation of what is probably benign and self resolving  musculoskeletal issues as a result of his fall however given the amount of pain, I feel imaging will be necessary.  Should the diagnostic films be definitive I do not feel any further evaluation will be necessary however the patient is still too painful to walk he may need CT imaging to rule out more subtle injury.  At this point I do not feel labs will benefit the patient or .    12:37 AM  Still very painful , feels no different. Having spasms.  1:44 AM  CT imaging does not demonstrate any acute bony abnormalities and the patient is still having painful spasms to the point where he cannot even roll over let alone sit up or stand.  Discussed options with the patient.  I will give him a milligram of IM Dilaudid in an attempt to control the pain so that he can get up and go home as I feel it is safe, however if he is unable to do this we may have to consider inpatient admission for IV pain control and MRI in the morning    2:44 AM  Patient is able to stand although pain does increase when he attempts to flex and rotate his torso which is to be expected.  He still had no radiculopathic symptoms and was able to ambulate although he was having muscle spasms.  Patient will follow up with his primary care provider if his symptoms persist and will be taken off work and, specifically, will not drill with the  this weekend as he will need rest and relaxation.  I will give him short prescriptions for both a muscle relaxant and narcotic analgesia as I do not feel over-the-counter pain medication will be adequate.  He will return if his symptoms worsen or change in any way.  At this point I do not feel MRI is necessary.    FINAL IMPRESSION  1. Acute bilateral low back pain without sciatica        Electronically signed by: Will Li M.D., 3/3/2021 11:23 PM

## 2021-03-04 NOTE — ED TRIAGE NOTES
Pt presents with friend from home for falling down stairs (10-15 steps). Reports he fell, landed on back, and slid down the rest of stairs. Pain with ambulation. Reports tingling in hips. No head injury. A&Ox4 and in wheelchair.     Patient masked. No respiratory symptoms, no recent travel, denies known COVID exposure.

## 2021-03-04 NOTE — ED NOTES
Pt able to ambulate independently, ERP notifed;    Pt educated on at-home care/treatment, Pt verbalized understanding;

## 2021-10-05 ENCOUNTER — TELEPHONE (OUTPATIENT)
Dept: PHYSICAL THERAPY | Facility: REHABILITATION | Age: 31
End: 2021-10-05

## 2021-10-06 NOTE — OP THERAPY DISCHARGE SUMMARY
Outpatient Physical Therapy  DISCHARGE SUMMARY NOTE      [facility]  [address], [address]  [place] [address]  Phone:  [phone]  Fax:  [phone]    Date of Visit: 10/05/2021    Patient: Jonathan Edward Kimura  YOB: 1990  MRN: [id number]     Referring Provider: Geovanny Aldana M.D.   Referring Diagnosis Sprain of anterior cruciate ligament of left knee, subsequent encounter [E18.701I]           Your patient is being discharged from Physical Therapy with the following comments:   · CX due to clinic shutting down due to COVID precautions    Comments:  Jonathan Edward Kimura has been discharged due to a lapse in care greater than 30 days. Thank you for the opportunity to assist you and your patient.     Limitations Remaining:  NA    Recommendations:  Discharge due to lapse in POC    Tay Clarke, PT, DPT    Date: 10/5/2021

## (undated) DEVICE — BANDAGE ELASTIC LATEX STERILE VELCRO 4 X 5 YDS (25EA/CA)

## (undated) DEVICE — DRAPE SURGICAL U 77X120 - (10/CA)

## (undated) DEVICE — SODIUM CHL. IRRIGATION 0.9% 3000ML (4EA/CA 65CA/PF)

## (undated) DEVICE — GLOVE BIOGEL PI INDICATOR SZ 7.0 SURGICAL PF LF - (50/BX 4BX/CA)

## (undated) DEVICE — TUBING PATIENT W/CONNECTOR REDEUCE (1EA)

## (undated) DEVICE — DRESSING 3X8 ADAPTIC GAUZE - NON-ADHERING (36/PK 6PK/BX)

## (undated) DEVICE — ELECTRODE 850 FOAM ADHESIVE - HYDROGEL RADIOTRNSPRNT (50/PK)

## (undated) DEVICE — KIT ANESTHESIA W/CIRCUIT & 3/LT BAG W/FILTER (20EA/CA)

## (undated) DEVICE — SHAVER4.0 RESECTOR FORMULA - (5EA/BX)

## (undated) DEVICE — GLOVE BIOGEL SZ 7 SURGICAL PF LTX - (50PR/BX 4BX/CA)

## (undated) DEVICE — ARTHROWAND TURBOVAC 3.5/90 SCT

## (undated) DEVICE — DRAPE IOBAN II INCISE 23X17 - (10EA/BX 4BX/CA)

## (undated) DEVICE — CANISTER SUCTION RIGID RED 1500CC (40EA/CA)

## (undated) DEVICE — HUMID-VENT HEAT AND MOISTURE EXCHANGE- (50/BX)

## (undated) DEVICE — NEPTUNE 4 PORT MANIFOLD - (20/PK)

## (undated) DEVICE — SUCTION INSTRUMENT YANKAUER BULBOUS TIP W/O VENT (50EA/CA)

## (undated) DEVICE — TUBE CONNECTING SUCTION - CLEAR PLASTIC STERILE 72 IN (50EA/CA)

## (undated) DEVICE — LACTATED RINGERS INJ 1000 ML - (14EA/CA 60CA/PF)

## (undated) DEVICE — CHLORAPREP 26 ML APPLICATOR - ORANGE TINT(25/CA)

## (undated) DEVICE — DRAPE LARGE 3 QUARTER - (20/CA)

## (undated) DEVICE — ELECTRODE DUAL RETURN W/ CORD - (50/PK)

## (undated) DEVICE — PACK KNEE ARTHROSCOPY SM OR - (2EA/CA)

## (undated) DEVICE — SODIUM CHL IRRIGATION 0.9% 1000ML (12EA/CA)

## (undated) DEVICE — DRAPE LOWER EXTREMETY - (6/CA)

## (undated) DEVICE — SUTURE GENERAL

## (undated) DEVICE — PADDING CAST 4 IN STERILE - 4 X 4 YDS (24/CA)

## (undated) DEVICE — CLOSURE SKIN STRIP 1/2 X 4 IN - (STERI STRIP) (50/BX 4BX/CA)

## (undated) DEVICE — SENSOR SPO2 NEO LNCS ADHESIVE (20/BX) SEE USER NOTES

## (undated) DEVICE — GOWN WARMING STANDARD FLEX - (30/CA)

## (undated) DEVICE — GAUZE FLUFF STERILE 2-PLY 36 X 36 (100EA/CA)

## (undated) DEVICE — MASK ANESTHESIA ADULT  - (100/CA)

## (undated) DEVICE — HEAD HOLDER JUNIOR/ADULT

## (undated) DEVICE — GLOVE, LITE (PAIR)

## (undated) DEVICE — TUBING PUMP WITH CONNECTOR REDEUCE (1EA)

## (undated) DEVICE — BAG SPONGE COUNT 10.25 X 32 - BLUE (250/CA)

## (undated) DEVICE — PROTECTOR ULNA NERVE - (36PR/CA)

## (undated) DEVICE — SUTURE 3-0 ETHILON FS-1 - (36/BX) 30 INCH

## (undated) DEVICE — WATER IRRIGATION STERILE 1000ML (12EA/CA)

## (undated) DEVICE — PADDING CAST 6 IN STERILE - 6 X 4 YDS (24/CA)